# Patient Record
Sex: FEMALE | Race: WHITE | NOT HISPANIC OR LATINO | Employment: OTHER | URBAN - METROPOLITAN AREA
[De-identification: names, ages, dates, MRNs, and addresses within clinical notes are randomized per-mention and may not be internally consistent; named-entity substitution may affect disease eponyms.]

---

## 2022-08-01 ENCOUNTER — OFFICE VISIT (OUTPATIENT)
Dept: INTERVENTIONAL RADIOLOGY/VASCULAR | Facility: CLINIC | Age: 72
End: 2022-08-01
Payer: MEDICARE

## 2022-08-01 VITALS
WEIGHT: 192.44 LBS | HEIGHT: 65 IN | HEART RATE: 72 BPM | BODY MASS INDEX: 32.06 KG/M2 | SYSTOLIC BLOOD PRESSURE: 153 MMHG | DIASTOLIC BLOOD PRESSURE: 85 MMHG

## 2022-08-01 DIAGNOSIS — I67.1 UNRUPTURED CEREBRAL ANEURYSM: Primary | ICD-10-CM

## 2022-08-01 PROCEDURE — 99204 OFFICE O/P NEW MOD 45 MIN: CPT | Mod: S$PBB,,, | Performed by: RADIOLOGY

## 2022-08-01 PROCEDURE — 99999 PR PBB SHADOW E&M-EST. PATIENT-LVL II: ICD-10-PCS | Mod: PBBFAC,,, | Performed by: RADIOLOGY

## 2022-08-01 PROCEDURE — 99999 PR PBB SHADOW E&M-EST. PATIENT-LVL II: CPT | Mod: PBBFAC,,, | Performed by: RADIOLOGY

## 2022-08-01 PROCEDURE — 99212 OFFICE O/P EST SF 10 MIN: CPT | Mod: PBBFAC | Performed by: RADIOLOGY

## 2022-08-01 PROCEDURE — 99204 PR OFFICE/OUTPT VISIT, NEW, LEVL IV, 45-59 MIN: ICD-10-PCS | Mod: S$PBB,,, | Performed by: RADIOLOGY

## 2022-08-01 NOTE — PROGRESS NOTES
Neurointerventional Clinic Note      Reason for Visit: Brain aneurysm    SUBJECTIVE:     Chief Complaint: Consult for brain aneurysm.     History of Present Illness: Faviola Coughlin is a 72 y.o. female with HTN  and hx of prior stroke who presents for consultation of her small right ICA aneurysm incidentally discovered during a workup for abdominal pain.  She was noted to have left sided weakness, so an MRI/MRA   Was obtained showing remote right frontal infarct, old right insular hemorrhage, and a 4-5mm right supraclinoid ICA aneurysm.  She also takes Eliquis for a hypercoagulability condition.  She notes that her left foot is dragging more.  She also had a hx of Bell's Palsy with mild residual left facial droop.     She had outside surgical consultation who recommended gall bladder surgery, but this was postponed to assess the patient's brain aneurysm.      Review of Systems:  Review of Systems   Constitutional: Positive for activity change, appetite change and fatigue.   HENT: Negative.    Eyes: Negative.    Respiratory: Negative.    Cardiovascular: Negative.    Gastrointestinal: Positive for abdominal pain and nausea.   Endocrine: Negative.    Musculoskeletal: Negative.    Skin: Negative.    Neurological: Positive for facial asymmetry and weakness.   Psychiatric/Behavioral: Negative.       OBJECTIVE:     Vital Signs Range:  There were no vitals taken for this visit.    Physical Exam:  Physical Exam  Constitutional:       Appearance: Normal appearance.   HENT:      Mouth/Throat:      Mouth: Mucous membranes are moist.   Eyes:      Extraocular Movements: Extraocular movements intact.      Pupils: Pupils are equal, round, and reactive to light.   Cardiovascular:      Rate and Rhythm: Normal rate and regular rhythm.      Pulses: Normal pulses.   Musculoskeletal:      Cervical back: No rigidity or tenderness.      Right lower leg: Edema present.      Left lower leg: Edema present.      Comments: Bilateral pre-tibial  edema   Neurological:      Mental Status: She is alert and oriented to person, place, and time.      Comments: Left leg slightly weaker than right. Mild left eyelid drooping and slight left facial droop   Psychiatric:         Mood and Affect: Mood normal.         Behavior: Behavior normal.         Thought Content: Thought content normal.         Judgment: Judgment normal.         Laboratory:  No results found for this or any previous visit.    Diagnostic Results:  MRA 6/24/22       ASSESSMENT/PLAN:     1. Small right supraclinoid ICA aneurysm.    2. Remote right frontal white matter CVA.   3. Remote right insular cortex hemorrhage.    The right supraclinoid ICA aneurysm measures about 4-5 mm, and it appears to be amenable for endovascular treatment with coiling.  This is low risk for rupture, less than 1% per year.  Given her advanced age and comorbidity including prior strokes, I think the risk to benefit ratio favors observation.  I am happy to reconsider elective treatment if the patient is strongly in favor of this, but at this point she wishes to observe. She should maintain a normal blood pressure and avoid smoking.  I discussed warning signs for aneurysms such as worsening headaches.  I recommend periodic MRA to follow the aneurysm, every 1-2 years to ensure it remains stable in size.    I do not believe the presence of this small unruptured aneurysm causes her to be higher risk for anesthesia and gallbladder surgery.       Ash Hardy MD  Interventional Neuroradiology  [facility]

## 2023-05-23 ENCOUNTER — PATIENT MESSAGE (OUTPATIENT)
Dept: RESEARCH | Facility: HOSPITAL | Age: 73
End: 2023-05-23
Payer: MEDICARE

## 2023-08-28 ENCOUNTER — TELEPHONE (OUTPATIENT)
Dept: NEUROSURGERY | Facility: CLINIC | Age: 73
End: 2023-08-28
Payer: MEDICARE

## 2023-08-28 NOTE — TELEPHONE ENCOUNTER
Called [facility] in regards to obtaining disc of MRI/MRA from August. Was told they will need to call back.

## 2023-08-30 ENCOUNTER — TELEPHONE (OUTPATIENT)
Dept: NEUROSURGERY | Facility: CLINIC | Age: 73
End: 2023-08-30
Payer: MEDICARE

## 2023-08-30 NOTE — TELEPHONE ENCOUNTER
----- Message from Zonia Mendoza sent at 8/30/2023  3:07 PM CDT -----  Regarding: pt advice  Contact: Roberta hernandez/[facility] @[phone]  Caller stated she cannot read the order that was sent over on the pt behalf. Pls call to discuss.

## 2023-09-07 NOTE — TELEPHONE ENCOUNTER
Called [facility] and left another vm for Roberta. Left clinic number to return call. Looking for MRA.

## 2023-10-13 NOTE — TELEPHONE ENCOUNTER
Called [facility] again. Had to leave . Left message pt has appt on Monday and will need to cancel if can not get disc. Left clinic number to return call.

## 2023-10-16 ENCOUNTER — OFFICE VISIT (OUTPATIENT)
Dept: NEUROSURGERY | Facility: CLINIC | Age: 73
End: 2023-10-16
Payer: MEDICARE

## 2023-10-16 VITALS
SYSTOLIC BLOOD PRESSURE: 124 MMHG | HEIGHT: 65 IN | BODY MASS INDEX: 30.16 KG/M2 | DIASTOLIC BLOOD PRESSURE: 78 MMHG | HEART RATE: 70 BPM | WEIGHT: 181 LBS

## 2023-10-16 DIAGNOSIS — I67.1 RIGHT INTERNAL CAROTID ARTERY ANEURYSM: Primary | ICD-10-CM

## 2023-10-16 PROCEDURE — 99204 OFFICE O/P NEW MOD 45 MIN: CPT | Mod: S$PBB,,, | Performed by: PHYSICIAN ASSISTANT

## 2023-10-16 PROCEDURE — 99999 PR PBB SHADOW E&M-EST. PATIENT-LVL III: CPT | Mod: PBBFAC,,, | Performed by: PHYSICIAN ASSISTANT

## 2023-10-16 PROCEDURE — 99999 PR PBB SHADOW E&M-EST. PATIENT-LVL III: ICD-10-PCS | Mod: PBBFAC,,, | Performed by: PHYSICIAN ASSISTANT

## 2023-10-16 PROCEDURE — 99213 OFFICE O/P EST LOW 20 MIN: CPT | Mod: PBBFAC | Performed by: PHYSICIAN ASSISTANT

## 2023-10-16 PROCEDURE — 99204 PR OFFICE/OUTPT VISIT, NEW, LEVL IV, 45-59 MIN: ICD-10-PCS | Mod: S$PBB,,, | Performed by: PHYSICIAN ASSISTANT

## 2023-10-16 RX ORDER — ACETAMINOPHEN 500 MG
3 TABLET ORAL NIGHTLY PRN
COMMUNITY

## 2023-10-16 RX ORDER — BACLOFEN 10 MG/1
10 TABLET ORAL NIGHTLY PRN
COMMUNITY
Start: 2023-09-07

## 2023-10-16 RX ORDER — LOSARTAN POTASSIUM 50 MG/1
1 TABLET ORAL DAILY
COMMUNITY
Start: 2023-04-07

## 2023-10-16 RX ORDER — METOPROLOL SUCCINATE 25 MG/1
1 TABLET, EXTENDED RELEASE ORAL DAILY
COMMUNITY
Start: 2023-08-07

## 2023-10-16 RX ORDER — IPRATROPIUM BROMIDE 42 UG/1
SPRAY, METERED NASAL
COMMUNITY
Start: 2023-08-31

## 2023-10-16 RX ORDER — BENAZEPRIL HYDROCHLORIDE 20 MG/1
1 TABLET ORAL DAILY
COMMUNITY

## 2023-10-16 RX ORDER — ONDANSETRON 4 MG/1
4 TABLET, FILM COATED ORAL 4 TIMES DAILY PRN
COMMUNITY
Start: 2023-04-24

## 2023-10-16 RX ORDER — PANTOPRAZOLE SODIUM 40 MG/1
40 TABLET, DELAYED RELEASE ORAL DAILY PRN
COMMUNITY
Start: 2023-06-21

## 2023-10-16 RX ORDER — HYDROCHLOROTHIAZIDE 12.5 MG/1
1 CAPSULE ORAL DAILY
COMMUNITY
Start: 2023-08-21

## 2023-10-16 RX ORDER — CHOLECALCIFEROL (VITAMIN D3) 25 MCG
1 TABLET ORAL EVERY MORNING
COMMUNITY

## 2023-10-16 RX ORDER — HYDRALAZINE HYDROCHLORIDE 25 MG/1
25 TABLET, FILM COATED ORAL EVERY 8 HOURS PRN
COMMUNITY
Start: 2023-09-20 | End: 2024-09-19

## 2023-10-16 RX ORDER — AMLODIPINE BESYLATE 10 MG/1
1 TABLET ORAL EVERY MORNING
COMMUNITY
Start: 2023-09-20 | End: 2024-09-19

## 2023-10-16 NOTE — PROGRESS NOTES
Neurosurgery  History & Physical    SUBJECTIVE:     Chief Complaint: cerebral aneurysm    History of Present Illness:  Faviola Coughlin is a 73 y.o. female who presents as a referral from Viji Peña NP for evaluation of incidentally found aneurysm. Pt was noted to have left sided weakness in early 2022 prior to cholecystectomy, concerning for stroke, MRI/MRA of the brain was done, which showed remote R frontal infarct, old R insular hemorrhage, as well as incidentally found 4-5 mm R supraclinoid ICA aneurysm without rupture. She was seen by IR, Dr. Hardy, in August 2022, who recommended observation rather than treatment due to her elevated stroke risk and low risk of rupture, with MRA every 1-2 years. Pt recently had MRA done by her PCP, which was concerning for small amount of aneurysmal growth compared to prior scan, so was referred here for further management. Of note, pt has a hx of Factor V Leiden for which she is chronically on Eliquis. She also has hx of polymyalgia rheumatica, follows with Rheumatology. She has mild residual LSW, as well as residual L facial weakness, was previously diagnosed with Bell's Palsy.     Pt is a non-smoker.  No known family hx of aneurysms.    Review of patient's allergies indicates:  No Known Allergies    Current Outpatient Medications   Medication Sig Dispense Refill    amLODIPine (NORVASC) 10 MG tablet Take 1 tablet by mouth every morning.      apixaban (ELIQUIS) 2.5 mg Tab Take by mouth 2 (two) times daily.      ascorbic acid, vitamin C, (VITAMIN C) 100 MG tablet Take 1 tablet by mouth every morning.      baclofen (LIORESAL) 10 MG tablet Take 10 mg by mouth nightly as needed.      benazepriL (LOTENSIN) 20 MG tablet Take 1 tablet by mouth once daily.      hydrALAZINE (APRESOLINE) 25 MG tablet Take 25 mg by mouth every 8 (eight) hours as needed.      hydroCHLOROthiazide (MICROZIDE) 12.5 mg capsule Take 1 capsule by mouth once daily.      ipratropium (ATROVENT) 42 mcg  "(0.06 %) nasal spray USE 2 SPRAYS IN EACH NOSTRIL THREE TIMES DAILY FOR RUNNY NOSE      losartan (COZAAR) 50 MG tablet Take 1 tablet by mouth once daily.      melatonin (MELATIN) 5 mg Take 3 mg by mouth nightly as needed.      metoprolol succinate (TOPROL-XL) 25 MG 24 hr tablet Take 1 tablet by mouth once daily.      ondansetron (ZOFRAN) 4 MG tablet Take 4 mg by mouth 4 (four) times daily as needed.      pantoprazole (PROTONIX) 40 MG tablet Take 40 mg by mouth daily as needed.      vitamin D (VITAMIN D3) 1000 units Tab Take 1 tablet by mouth every morning.       No current facility-administered medications for this visit.       History reviewed. No pertinent past medical history.  History reviewed. No pertinent surgical history.  Family History    None       Social History     Socioeconomic History    Marital status:        Review of Systems  Positive per HPI, otherwise a pertinent ROS was performed and was negative.        OBJECTIVE:     Vital Signs  Pulse: 70  BP: 124/78  Pain Score:   4  Height: 5' 5" (165.1 cm)  Weight: 82.1 kg (181 lb)  Body mass index is 30.12 kg/m².      Neurosurgery Physical Exam  General: well developed, well nourished, no distress.   Head: normocephalic, atraumatic  Neck: No tracheal deviation. No palpable masses. Full ROM.   Neurologic: Alert and oriented. Thought content appropriate.  GCS: E4 V5 M6; Total: 15  Mental Status: Awake, Alert, Oriented x 4  Language: No aphasia  Speech: No dysarthria  Cranial nerves:   CN II: Pupils are equal, round and reactive to light and accommodation   CN III, IV & VI: Extraocular movements full and intact   CN V: Facial sensation intact bilaterally. Mastication muscles WNL  CN VII: Mild L-sided facial weakness.   CN VIII: Hearing intact to finger rub bilaterally.  CN IX & X: Tongue/uvula midline.   CN XI: Shoulder shrug/trapezius strength WNL.   CN XII: Tongue protrusion WNL.    Pulmonary: normal respirations, no signs of respiratory " distress  Abdomen: soft, non-distended, not tender to palpation  Skin: Skin is warm, dry and intact.    Sensory: intact to light touch throughout  Motor Strength: Moves all extremities spontaneously with good tone.  No abnormal movements seen. Mild L olive-paresis, 4+/5.    Finger-to-nose: intact bilaterally   Pronator drift: absent bilaterally        Diagnostic Results:  Imaging was independently reviewed by myself.    MRA brain 8/23/23:  - R supraclinoid ICA aneurysm, measuring approximately 5 mm, possible slight increase in size when compared to prior MRA from 6/24/22.         ASSESSMENT/PLAN:     1. Right internal carotid artery aneurysm        Faviola Coughlin is a 73 y.o. female with R supraclinoid ICA aneurysm, non-ruptured. Recent MRA concerning for small interval increase in size compared to prior scan from June 2022.     - Recommend MRA with vessel wall imaging to assess for any abnormal wall enhancement  - Recommend diagnostic angiogram for further characterization of aneurysm and to determine possible treatment plan   - We discussed the procedure in detail including risks/benefits, indications, and alternatives. All of the patient's/family's questions have been answered and patient wishes to proceed with procedure at this time.   - Follow up in clinic with Dr. Godoy after the above completed to discuss results and next steps      Diagnoses addressed and orders placed this encounter:      Right internal carotid artery aneurysm  -     MRA Brain with and without contrast; Future; Expected date: 10/25/2023  -     IR Angiogram Carotid Cerebral Bilateral inc Arch; Future; Expected date: 10/25/2023          Raeann Garcia PA-C  Department of Neurosurgery  [facility]

## 2023-10-25 ENCOUNTER — TELEPHONE (OUTPATIENT)
Dept: NEUROSURGERY | Facility: CLINIC | Age: 73
End: 2023-10-25
Payer: MEDICARE

## 2023-10-25 DIAGNOSIS — I67.1 RIGHT INTERNAL CAROTID ARTERY ANEURYSM: Primary | ICD-10-CM

## 2023-10-25 NOTE — TELEPHONE ENCOUNTER
Called and spoke with pt. Instructed MRA scheduled for 11/30/23 at 630am. Need to report to imaging center for 6am. Then will need to come to hospital after for angiogram. Pt plans to come in to town the night before. Will have labs done at [facility] on 11/29/23. Instructed once time gets closer will call and review angio instructions. Pt stated she does have the written instructions.

## 2023-11-09 ENCOUNTER — TELEPHONE (OUTPATIENT)
Dept: NEUROSURGERY | Facility: CLINIC | Age: 73
End: 2023-11-09
Payer: MEDICARE

## 2023-11-09 ENCOUNTER — PATIENT MESSAGE (OUTPATIENT)
Dept: NEUROSURGERY | Facility: CLINIC | Age: 73
End: 2023-11-09
Payer: MEDICARE

## 2023-11-09 NOTE — TELEPHONE ENCOUNTER
----- Message from Shanna Mayer MA sent at 11/8/2023 11:06 AM CST -----  Contact: [phone]  Tony Urbina, can you please advise this pt? Thank you!  ----- Message -----  From: Shivani Abraham  Sent: 11/8/2023  10:57 AM CST  To: Walt MARLOW Staff    PATIENTCALL     Pt is calling to speak with someone in provider office regarding she states that she is having a procedure done on 11/30 an she has gotten a call to let her know what medications she needs to stop she is asking for a return call please call pt at [phone]

## 2023-11-09 NOTE — TELEPHONE ENCOUNTER
Called and spoke with pt and dtr. Instructed on angiogram procedure.   MRA at 630 at imaging center across the street. Report to 3 rd floor SSCU after MRA.   Nothing to eat or drink after 12 midnight   Do not take any medications for sleep or anxiety night before procedure.  Do not take any diabetic oral medications the night before or morning of procedure  Take blood pressure medication with a small sip of water morning of procedure  Labs to be done prior to procedure  Remove any nail polish or gel nail from one finger of each hand  Morning of procedure shower with antibacterial soap (dial, dove). Do not use hair spray, hair pins/clips or other hair products.Do not use perfume, powder, deodorant, after shave, makeup, mascara or false eyelashes or lotions after shower.  May wear glasses, contact lens, dentures or hearing aids but bring case to put them in when procedure started  Do not lift anything heavier than 10 pounds  Avoid strenuous activity for 2 weeks  Will need someone to drive home after procedure and for about 3 days after procedure  Need to be monitor for about 8 hours after home. Observe for shortness of breath, numbness/tingling in any extremity,difficulty speaking (report to nearest emergency room)  Keep incision clian and dry for 24 hrs. Remove bandage after 24 hrs and shower. Do not bathe or submerge in water until site is completely healed. Do not allow force of water to hit site until healed.

## 2023-11-29 ENCOUNTER — TELEPHONE (OUTPATIENT)
Dept: INTERVENTIONAL RADIOLOGY/VASCULAR | Facility: HOSPITAL | Age: 73
End: 2023-11-29
Payer: MEDICARE

## 2023-11-29 ENCOUNTER — LAB VISIT (OUTPATIENT)
Dept: LAB | Facility: HOSPITAL | Age: 73
End: 2023-11-29
Attending: PHYSICIAN ASSISTANT
Payer: MEDICARE

## 2023-11-29 DIAGNOSIS — I67.1 RIGHT INTERNAL CAROTID ARTERY ANEURYSM: ICD-10-CM

## 2023-11-29 LAB
ANION GAP SERPL CALC-SCNC: 10 MMOL/L (ref 8–16)
BUN SERPL-MCNC: 15 MG/DL (ref 8–23)
CALCIUM SERPL-MCNC: 9.5 MG/DL (ref 8.7–10.5)
CHLORIDE SERPL-SCNC: 103 MMOL/L (ref 95–110)
CO2 SERPL-SCNC: 29 MMOL/L (ref 23–29)
CREAT SERPL-MCNC: 1.1 MG/DL (ref 0.5–1.4)
EST. GFR  (NO RACE VARIABLE): 52.4 ML/MIN/1.73 M^2
GLUCOSE SERPL-MCNC: 112 MG/DL (ref 70–110)
POTASSIUM SERPL-SCNC: 3.5 MMOL/L (ref 3.5–5.1)
SODIUM SERPL-SCNC: 142 MMOL/L (ref 136–145)

## 2023-11-29 PROCEDURE — 80048 BASIC METABOLIC PNL TOTAL CA: CPT | Performed by: PHYSICIAN ASSISTANT

## 2023-11-29 PROCEDURE — 36415 COLL VENOUS BLD VENIPUNCTURE: CPT | Performed by: PHYSICIAN ASSISTANT

## 2023-11-30 ENCOUNTER — HOSPITAL ENCOUNTER (OUTPATIENT)
Dept: RADIOLOGY | Facility: HOSPITAL | Age: 73
Discharge: HOME | End: 2023-11-30
Attending: PHYSICIAN ASSISTANT
Payer: MEDICARE

## 2023-11-30 ENCOUNTER — HOSPITAL ENCOUNTER (OUTPATIENT)
Dept: INTERVENTIONAL RADIOLOGY/VASCULAR | Facility: HOSPITAL | Age: 73
Discharge: HOME | End: 2023-11-30
Attending: PHYSICIAN ASSISTANT | Admitting: NEUROLOGICAL SURGERY
Payer: MEDICARE

## 2023-11-30 VITALS
OXYGEN SATURATION: 97 % | SYSTOLIC BLOOD PRESSURE: 159 MMHG | RESPIRATION RATE: 16 BRPM | HEART RATE: 66 BPM | DIASTOLIC BLOOD PRESSURE: 86 MMHG

## 2023-11-30 VITALS
HEART RATE: 69 BPM | DIASTOLIC BLOOD PRESSURE: 72 MMHG | RESPIRATION RATE: 20 BRPM | HEIGHT: 65 IN | WEIGHT: 183 LBS | TEMPERATURE: 99 F | SYSTOLIC BLOOD PRESSURE: 135 MMHG | BODY MASS INDEX: 30.49 KG/M2 | OXYGEN SATURATION: 95 %

## 2023-11-30 DIAGNOSIS — I67.1 RIGHT INTERNAL CAROTID ARTERY ANEURYSM: ICD-10-CM

## 2023-11-30 PROCEDURE — 36224 PLACE CATH CAROTD ART: CPT | Mod: 50,,, | Performed by: NEUROLOGICAL SURGERY

## 2023-11-30 PROCEDURE — C1769 GUIDE WIRE: HCPCS

## 2023-11-30 PROCEDURE — 76377 3D RENDER W/INTRP POSTPROCES: CPT | Mod: 59,TC | Performed by: NEUROLOGICAL SURGERY

## 2023-11-30 PROCEDURE — 36226 PLACE CATH VERTEBRAL ART: CPT | Mod: 51,RT,, | Performed by: NEUROLOGICAL SURGERY

## 2023-11-30 PROCEDURE — 25500020 PHARM REV CODE 255: Performed by: PHYSICIAN ASSISTANT

## 2023-11-30 PROCEDURE — 63600175 PHARM REV CODE 636 W HCPCS: Performed by: STUDENT IN AN ORGANIZED HEALTH CARE EDUCATION/TRAINING PROGRAM

## 2023-11-30 PROCEDURE — 76377 PR  3D RENDERING W/ IMAGE POSTPROCESS: ICD-10-PCS | Mod: 26,,, | Performed by: NEUROLOGICAL SURGERY

## 2023-11-30 PROCEDURE — 70546 MRA BRAIN WITH AND WITHOUT: ICD-10-PCS | Mod: 26,,, | Performed by: STUDENT IN AN ORGANIZED HEALTH CARE EDUCATION/TRAINING PROGRAM

## 2023-11-30 PROCEDURE — A9585 GADOBUTROL INJECTION: HCPCS | Performed by: PHYSICIAN ASSISTANT

## 2023-11-30 PROCEDURE — 25000003 PHARM REV CODE 250: Performed by: STUDENT IN AN ORGANIZED HEALTH CARE EDUCATION/TRAINING PROGRAM

## 2023-11-30 PROCEDURE — 36224 IR ANGIOGRAM CAROTID INTERNAL INC ARCH AND CEREBRAL BILAT: ICD-10-PCS | Mod: 50,,, | Performed by: NEUROLOGICAL SURGERY

## 2023-11-30 PROCEDURE — 63600175 PHARM REV CODE 636 W HCPCS: Performed by: NEUROLOGICAL SURGERY

## 2023-11-30 PROCEDURE — 70546 MR ANGIOGRAPH HEAD W/O&W/DYE: CPT | Mod: TC

## 2023-11-30 PROCEDURE — 76377 3D RENDER W/INTRP POSTPROCES: CPT | Mod: 26,,, | Performed by: NEUROLOGICAL SURGERY

## 2023-11-30 PROCEDURE — 36224 PLACE CATH CAROTD ART: CPT | Mod: 50 | Performed by: NEUROLOGICAL SURGERY

## 2023-11-30 PROCEDURE — 25500020 PHARM REV CODE 255: Performed by: NEUROLOGICAL SURGERY

## 2023-11-30 PROCEDURE — 36226 PR ANGIO VERTEBRAL ARTERY +/- CERVIOCEREBRAL ARCH, VERTEBRAL ART, SELECTV CATH,S&I: ICD-10-PCS | Mod: 51,RT,, | Performed by: NEUROLOGICAL SURGERY

## 2023-11-30 PROCEDURE — 36226 PLACE CATH VERTEBRAL ART: CPT | Mod: RT | Performed by: NEUROLOGICAL SURGERY

## 2023-11-30 PROCEDURE — 70546 MR ANGIOGRAPH HEAD W/O&W/DYE: CPT | Mod: 26,,, | Performed by: STUDENT IN AN ORGANIZED HEALTH CARE EDUCATION/TRAINING PROGRAM

## 2023-11-30 RX ORDER — SODIUM CHLORIDE 9 MG/ML
INJECTION, SOLUTION INTRAVENOUS CONTINUOUS
Status: DISCONTINUED | OUTPATIENT
Start: 2023-11-30 | End: 2023-12-01 | Stop reason: HOSPADM

## 2023-11-30 RX ORDER — SODIUM CHLORIDE 0.9 % (FLUSH) 0.9 %
10 SYRINGE (ML) INJECTION
Status: DISCONTINUED | OUTPATIENT
Start: 2023-11-30 | End: 2023-12-01 | Stop reason: HOSPADM

## 2023-11-30 RX ORDER — MIDAZOLAM HYDROCHLORIDE 1 MG/ML
INJECTION INTRAMUSCULAR; INTRAVENOUS
Status: COMPLETED | OUTPATIENT
Start: 2023-11-30 | End: 2023-11-30

## 2023-11-30 RX ORDER — ONDANSETRON 2 MG/ML
4 INJECTION INTRAMUSCULAR; INTRAVENOUS EVERY 12 HOURS PRN
Status: DISCONTINUED | OUTPATIENT
Start: 2023-11-30 | End: 2023-12-01 | Stop reason: HOSPADM

## 2023-11-30 RX ORDER — HEPARIN SODIUM 1000 [USP'U]/ML
INJECTION, SOLUTION INTRAVENOUS; SUBCUTANEOUS
Status: COMPLETED | OUTPATIENT
Start: 2023-11-30 | End: 2023-11-30

## 2023-11-30 RX ORDER — FENTANYL CITRATE 50 UG/ML
INJECTION, SOLUTION INTRAMUSCULAR; INTRAVENOUS
Status: COMPLETED | OUTPATIENT
Start: 2023-11-30 | End: 2023-11-30

## 2023-11-30 RX ORDER — OXYCODONE AND ACETAMINOPHEN 5; 325 MG/1; MG/1
TABLET ORAL
Status: DISCONTINUED
Start: 2023-11-30 | End: 2023-12-01 | Stop reason: HOSPADM

## 2023-11-30 RX ORDER — IODIXANOL 320 MG/ML
100 INJECTION, SOLUTION INTRAVASCULAR
Status: COMPLETED | OUTPATIENT
Start: 2023-11-30 | End: 2023-11-30

## 2023-11-30 RX ORDER — NAPROXEN SODIUM 220 MG/1
81 TABLET, FILM COATED ORAL DAILY
COMMUNITY
End: 2024-01-11

## 2023-11-30 RX ORDER — GADOBUTROL 604.72 MG/ML
9 INJECTION INTRAVENOUS
Status: COMPLETED | OUTPATIENT
Start: 2023-11-30 | End: 2023-11-30

## 2023-11-30 RX ORDER — HYDROCODONE BITARTRATE AND ACETAMINOPHEN 5; 325 MG/1; MG/1
1 TABLET ORAL EVERY 6 HOURS PRN
Status: DISCONTINUED | OUTPATIENT
Start: 2023-11-30 | End: 2023-12-01 | Stop reason: HOSPADM

## 2023-11-30 RX ADMIN — FENTANYL CITRATE 50 MCG: 50 INJECTION, SOLUTION INTRAMUSCULAR; INTRAVENOUS at 01:11

## 2023-11-30 RX ADMIN — MIDAZOLAM HYDROCHLORIDE 1 MG: 2 INJECTION, SOLUTION INTRAMUSCULAR; INTRAVENOUS at 01:11

## 2023-11-30 RX ADMIN — HEPARIN SODIUM 1500 UNITS: 1000 INJECTION, SOLUTION INTRAVENOUS; SUBCUTANEOUS at 02:11

## 2023-11-30 RX ADMIN — FENTANYL CITRATE 25 MCG: 50 INJECTION, SOLUTION INTRAMUSCULAR; INTRAVENOUS at 02:11

## 2023-11-30 RX ADMIN — IODIXANOL 80 ML: 320 INJECTION, SOLUTION INTRAVASCULAR at 03:11

## 2023-11-30 RX ADMIN — FENTANYL CITRATE 25 MCG: 50 INJECTION, SOLUTION INTRAMUSCULAR; INTRAVENOUS at 01:11

## 2023-11-30 RX ADMIN — MIDAZOLAM HYDROCHLORIDE 0.5 MG: 2 INJECTION, SOLUTION INTRAMUSCULAR; INTRAVENOUS at 01:11

## 2023-11-30 RX ADMIN — HYDROCODONE BITARTRATE AND ACETAMINOPHEN 1 TABLET: 5; 325 TABLET ORAL at 03:11

## 2023-11-30 RX ADMIN — MIDAZOLAM HYDROCHLORIDE 0.5 MG: 2 INJECTION, SOLUTION INTRAMUSCULAR; INTRAVENOUS at 02:11

## 2023-11-30 RX ADMIN — GADOBUTROL 9 ML: 604.72 INJECTION INTRAVENOUS at 07:11

## 2023-11-30 NOTE — PLAN OF CARE
Pt arrived to IR rm 4 for diagnostic cerebral angiogram. Pt oriented to unit and staff, Pt safely transferred from stretcher to procedural table. Fall risk reviewed and comfort measures utilized with interventions. Safety strap applied, position pillows to minimize pressure points. Blankets applied. Pt prepped and draped utilizing standard sterile technique. Patient placed on continuous monitoring, as required by sedation policy. Timeouts implemented utilizing standard universal time-out per department and facility policy. CRNA to remain at bedside with continuous monitoring. Pt resting comfortably. Denies pain/discomfort. Will continue to monitor. See flow sheets for monitoring, medication administration, and updates. patient verbalizes understanding.

## 2023-11-30 NOTE — PLAN OF CARE
Procedure complete. Pt tolerated well. Recovery for 4 hr flat, manual pressure, hemostasis time 1455. Site CDI. Pt transferred to [place] [id number] and report to be given bedside.

## 2023-11-30 NOTE — H&P
HPI:  Faviola Coughlin is a 73 y.o. female who presents from outpatient clinic for angiogram to evaluate incidentally found R supraclinoid ICA aneurysm. Pt was noted to have left sided weakness in early 2022 prior to cholecystectomy, concerning for stroke, MRI/MRA of the brain was done, which showed remote R frontal infarct, old R insular hemorrhage, as well as incidentally found 4-5 mm R supraclinoid ICA aneurysm without rupture. She was seen by IR, Dr. Hardy, in August 2022, who recommended observation rather than treatment due to her elevated stroke risk and low risk of rupture, with MRA every 1-2 years. Pt recently had MRA done by her PCP, which was concerning for small amount of aneurysmal growth compared to prior scan, so was referred here for further management. Of note, pt has a hx of Factor V Leiden for which she is chronically on Eliquis. She also has hx of polymyalgia rheumatica, follows with Rheumatology. She has mild residual LSW, as well as residual L facial weakness, was previously diagnosed with Bell's Palsy.       ROS:  10-point ROS otherwise negative      Exam:  General: well developed, well nourished, no distress.   Head: normocephalic, atraumatic  Neck: No tracheal deviation. No palpable masses. Full ROM.   Neurologic: Alert and oriented. Thought content appropriate.  GCS: E4 V5 M6; Total: 15  Mental Status: Awake, Alert, Oriented x 4  Language: No aphasia  Speech: No dysarthria  Cranial nerves:   CN II: Pupils are equal, round and reactive to light and accommodation   CN III, IV & VI: Extraocular movements full and intact   CN V: Facial sensation intact bilaterally. Mastication muscles WNL  CN VII: Mild L-sided facial weakness.   CN VIII: Hearing intact to finger rub bilaterally.  CN IX & X: Tongue/uvula midline.   CN XI: Shoulder shrug/trapezius strength WNL.   CN XII: Tongue protrusion WNL.     Pulmonary: normal respirations, no signs of respiratory distress  Abdomen: soft, non-distended,  not tender to palpation  Skin: Skin is warm, dry and intact.     Sensory: intact to light touch throughout  Motor Strength: Moves all extremities spontaneously with good tone.  No abnormal movements seen. Mild L olive-paresis, 4+/5.     Finger-to-nose: intact bilaterally   Pronator drift: absent bilaterally      A&P:  73 y.o. female who presents from outpatient clinic for angiogram to evaluate incidentally found R supraclinoid ICA aneurysm:    --Patient evaluated prior to procedure          -ASA 2/Mallampati 2  --Plan for cerebral angiogram with possible intervention; moderate sedation  --All diagnostics and imaging reviewed  --Patient NPO since MN  --No anti-coag/plt medication in the last 72h  --Risks & benefits of procedure explained in detail; patient consented and all questions answered  --Further reccs to follow procedure    Dispo: Ongoing

## 2023-11-30 NOTE — DISCHARGE INSTRUCTIONS
Please call with any questions or concerns.      Monday thru Friday 8:00 am - 4:30 pm    Interventional Radiology   [phone]    After Hours    Ask for the Radiology Resident on call  [phone]

## 2023-11-30 NOTE — BRIEF OP NOTE
Procedural Date:  11/30/23    Attending:  Mary Jo Godoy MD    Fellow(s):  MD Umair Lozoya MD    Preoperative Diagnoses:   R supraclinoid ICA aneurysm     Postoperative Diagnosis:  Same; 5x6mm R supraclinoid ICA aneurysm with multiple excrescences, small paraclinoid outpouching remote to larger aneurysm      Procedure:   4-vessel cerebral angiogram without intervention     Written Informed Consent Obtained:   Yes; obtained from patient     Specimen Removed:   None     Estimated Blood Loss:  Minimal     Brief Procedure report:   A 5 Bulgarian sheath was placed into the right common femoral artery and a 5 Bulgarian Kyle diagnostic catheter was advanced into the aortic arch.  The R innominate, R vertebral, R common carotid, R internal carotid, L common carotid, L internal carotid & L vertebral arteries were subselected and angiography of the brain was performed. A 5x6mm R supraclinoid ICA aneurysm with multiple excrescences and a small paraclinoid outpouching remote to larger aneurysm was appreciated on multiple runs including 3D spin sequence. There is no other evidence of aneurysm, fistula, malformation, or significant stenoocclusive disease.  A right common femoral artery angiogram was performed, the sheath removed and hemostasis achieved via 5 Bulgarian Mynx closure device.  No hematoma was present at the time of hemostasis.   The patient tolerated the procedure well.     Preliminary Interpretation:   1.    5x6mm R supraclinoid ICA aneurysm with multiple excrescences, small paraclinoid outpouching remote to larger aneurysm   2.    Otherwise normal cerebral angiogram.     (Please see Imaging report for full details)    Disposition:  [facility]

## 2023-12-01 NOTE — NURSING
Pt discharged. D/c instructions reviewed with patient and family. All questions answered at this time. VSS. Site CDI. Pt transported via wheelchair to [facility] by family member.

## 2023-12-04 ENCOUNTER — TELEPHONE (OUTPATIENT)
Dept: NEUROSURGERY | Facility: CLINIC | Age: 73
End: 2023-12-04
Payer: MEDICARE

## 2023-12-05 ENCOUNTER — TELEPHONE (OUTPATIENT)
Dept: NEUROSURGERY | Facility: CLINIC | Age: 73
End: 2023-12-05
Payer: MEDICARE

## 2023-12-05 NOTE — TELEPHONE ENCOUNTER
----- Message from Rand Rod sent at 12/5/2023 10:25 AM CST -----  Regarding: Rash  Contact: Pt @ [phone]  Pt is calling to inform office that she broke out with a rash all over her body. Asking for a call back

## 2023-12-05 NOTE — TELEPHONE ENCOUNTER
Returned call to pt. Pt stated she broke out in rash. Explained if it is the dye from angio usually happens right after injections. Instructed to notify pcp and can take otc benadryl. Pt verbalized understanding.

## 2023-12-13 ENCOUNTER — CLINICAL SUPPORT (OUTPATIENT)
Dept: NEUROSURGERY | Facility: CLINIC | Age: 73
End: 2023-12-13
Payer: MEDICARE

## 2023-12-13 DIAGNOSIS — I67.1 RIGHT INTERNAL CAROTID ARTERY ANEURYSM: Primary | ICD-10-CM

## 2023-12-13 NOTE — PROGRESS NOTES
Faviola Gutierrez a 73 y.o. female here for 2 week post op wound check.    Surgery: Pt is POD 14 FROM angiogram  on 11/30/23 by Dr. Godoy.    DME: none    Brace in Use: none    SUBJECTIVE    New Symptoms: pt c/o developed severe rash over entire body a few days after the angiogram. Not sure what caused it. Placed iodine allergy on chart.        PAIN MANAGEMENT     0,       INCISION (S)    Location: rt groin    Incision Status: Clean, Dry, RAOUL. Edges well-approximated. No drainage or swelling noted.     PICTURE    Pt declined to have picture taken. Stated site is closed, no openings, no drainage, no redness or swelling.     INTERVENTION    Incision: closed edges approximated not draninage, redness or swelling as reported by pt.     Medication Refills Requested: None     EDUCATION    Reviewed Post Op instructions with patient.  Keep incision RAOUL, no lotions, creams or bandages.  Ok to shower without direct water pressure to incision. Pat dry after shower.  Do not submerge wound in bath tub or go swimming until released by surgeon.  No lifting > 10lbs.  Take over the counter stool softner/laxative for constipation.  Call your doctor or report to the Emergency Room for any signs of infection, including: increased redness, drainage, pain or fever (temperature greater than or equal to 101.5 for 24 hours). Call doctor or go to the Emergency Room if there are any localized neurological changes; problems with speech, vision, numbness, tingling, weakness, or severe headache; or for other concerns.      All questions answered. Pt encouraged to call clinic or send message through portal with any future concerns. Patient verbalized understanding.     FOLLOW UP    Future Appointments   Date Time Provider Department Center   12/13/2023 10:00 AM Dilma Bustos, RAN [facility]   1/2/2024 10:30 AM Raeann Garcia PA-C [facility]

## 2024-01-02 ENCOUNTER — TELEPHONE (OUTPATIENT)
Dept: NEUROSURGERY | Facility: CLINIC | Age: 74
End: 2024-01-02
Payer: MEDICARE

## 2024-01-11 DIAGNOSIS — I67.1 RIGHT INTERNAL CAROTID ARTERY ANEURYSM: Primary | ICD-10-CM

## 2024-01-11 RX ORDER — ASPIRIN 325 MG
325 TABLET, DELAYED RELEASE (ENTERIC COATED) ORAL DAILY
Qty: 30 TABLET | Refills: 11 | Status: SHIPPED | OUTPATIENT
Start: 2024-01-11 | End: 2025-01-10

## 2024-01-11 RX ORDER — CLOPIDOGREL BISULFATE 75 MG/1
75 TABLET ORAL DAILY
Qty: 30 TABLET | Refills: 11 | Status: SHIPPED | OUTPATIENT
Start: 2024-01-11 | End: 2025-01-10

## 2024-01-11 NOTE — PROGRESS NOTES
Spoke with pt via phone regarding results of recent imaging studies.     Diagnostic angiogram showed multi-lobulated R ICA supraclinoid aneurysm. Neck 2.67 mm x varied 6.52 mm x length 5.36 mm.   MRA with vessel wall imaging with mild enhancement along the aneurysm wall.  Recent MRA from 8/23/23 was suggestive of mild growth of aneurysm as compared to prior imaging from 2022.    - Given the above, would recommend treatment of aneurysm at this stage. Plan for endovascular embolization with combination of coiling and Flow Diversion. Discussed with patient and in agreement with proceeding.  - Will start on  mg QD and Plavix 75 mg QD at least 2 weeks prior to procedure  - ARU/PRU preop to ensure therapeutic levels    Plan was discussed with Dr. Walt Garcia, PA-C  Neurosurgery  [facility]

## 2024-01-22 ENCOUNTER — PATIENT MESSAGE (OUTPATIENT)
Dept: NEUROSURGERY | Facility: CLINIC | Age: 74
End: 2024-01-22
Payer: MEDICARE

## 2024-02-09 ENCOUNTER — PATIENT MESSAGE (OUTPATIENT)
Dept: NEUROSURGERY | Facility: CLINIC | Age: 74
End: 2024-02-09
Payer: MEDICARE

## 2024-02-28 ENCOUNTER — PATIENT MESSAGE (OUTPATIENT)
Dept: NEUROSURGERY | Facility: CLINIC | Age: 74
End: 2024-02-28
Payer: MEDICARE

## 2024-03-18 ENCOUNTER — PATIENT MESSAGE (OUTPATIENT)
Dept: NEUROSURGERY | Facility: CLINIC | Age: 74
End: 2024-03-18
Payer: MEDICARE

## 2024-03-21 ENCOUNTER — PATIENT MESSAGE (OUTPATIENT)
Dept: NEUROSURGERY | Facility: CLINIC | Age: 74
End: 2024-03-21
Payer: MEDICARE

## 2024-03-26 ENCOUNTER — TELEPHONE (OUTPATIENT)
Dept: NEUROSURGERY | Facility: CLINIC | Age: 74
End: 2024-03-26
Payer: MEDICARE

## 2024-03-26 DIAGNOSIS — I67.1 RIGHT INTERNAL CAROTID ARTERY ANEURYSM: Primary | ICD-10-CM

## 2024-03-26 RX ORDER — PREDNISONE 50 MG/1
TABLET ORAL
Qty: 3 TABLET | Refills: 0 | Status: ON HOLD | OUTPATIENT
Start: 2024-03-26 | End: 2024-04-12

## 2024-03-26 RX ORDER — DIPHENHYDRAMINE HCL 50 MG
CAPSULE ORAL
Qty: 1 CAPSULE | Refills: 0 | Status: ON HOLD
Start: 2024-03-26 | End: 2024-04-12 | Stop reason: HOSPADM

## 2024-04-02 ENCOUNTER — TELEPHONE (OUTPATIENT)
Dept: NEUROSURGERY | Facility: CLINIC | Age: 74
End: 2024-04-02
Payer: MEDICARE

## 2024-04-02 DIAGNOSIS — I67.1 RIGHT INTERNAL CAROTID ARTERY ANEURYSM: Primary | ICD-10-CM

## 2024-04-05 ENCOUNTER — LAB VISIT (OUTPATIENT)
Dept: LAB | Facility: HOSPITAL | Age: 74
End: 2024-04-05
Payer: MEDICARE

## 2024-04-05 ENCOUNTER — TELEPHONE (OUTPATIENT)
Dept: NEUROSURGERY | Facility: CLINIC | Age: 74
End: 2024-04-05
Payer: MEDICARE

## 2024-04-05 ENCOUNTER — PATIENT MESSAGE (OUTPATIENT)
Dept: NEUROSURGERY | Facility: CLINIC | Age: 74
End: 2024-04-05
Payer: MEDICARE

## 2024-04-05 DIAGNOSIS — I67.1 RIGHT INTERNAL CAROTID ARTERY ANEURYSM: ICD-10-CM

## 2024-04-05 DIAGNOSIS — I67.1 RIGHT INTERNAL CAROTID ARTERY ANEURYSM: Primary | ICD-10-CM

## 2024-04-05 LAB
PA AA PRP-ACNC: 383 ARU (ref 620–672)
PLATELET RESPONSE PLAVIX: 214 PRU (ref 194–418)

## 2024-04-05 PROCEDURE — 36415 COLL VENOUS BLD VENIPUNCTURE: CPT | Performed by: PHYSICIAN ASSISTANT

## 2024-04-05 PROCEDURE — 85576 BLOOD PLATELET AGGREGATION: CPT | Mod: 91 | Performed by: PHYSICIAN ASSISTANT

## 2024-04-05 PROCEDURE — 85576 BLOOD PLATELET AGGREGATION: CPT | Performed by: PHYSICIAN ASSISTANT

## 2024-04-05 RX ORDER — ASPIRIN 81 MG/1
81 TABLET ORAL DAILY
Qty: 30 TABLET | Refills: 0 | Status: SHIPPED | OUTPATIENT
Start: 2024-04-05 | End: 2025-04-05

## 2024-04-05 NOTE — TELEPHONE ENCOUNTER
Returned call to pt. Explained that the PA is in clinic so the prescription has not been called in yet. Informed that we already sent the PA a message. Stated that we don't know how long it will take and do not have control on it.  Pt v/u

## 2024-04-05 NOTE — TELEPHONE ENCOUNTER
Called and spoke with pt. Instructed labs are not therapeutic so have to stop plavix and begin Brilinta 90mg two times a day with ASA 81mg. Instructed to decrease asa to 81 mg. Repeat labs on Wednesday 04/10/24 per pt request. Pt rose.

## 2024-04-05 NOTE — TELEPHONE ENCOUNTER
----- Message from Lois Salvador sent at 4/5/2024  2:54 PM CDT -----  Contact: [phone]  LUPE GARSIA calling regarding Patient Advice (message) for want to know the name of medication that was call into [facility] pharmacy today.  She is waiting outside store for it because she live 20 miles from [facility].   Did you send a Rx to them for her.  Please advise    [id number] - [place] - [address]  [address] [place] [address]  Phone: [phone] Fax: [phone]

## 2024-04-10 ENCOUNTER — TELEPHONE (OUTPATIENT)
Dept: NEUROSURGERY | Facility: CLINIC | Age: 74
End: 2024-04-10
Payer: MEDICARE

## 2024-04-10 ENCOUNTER — DOCUMENTATION ONLY (OUTPATIENT)
Dept: PREADMISSION TESTING | Facility: HOSPITAL | Age: 74
End: 2024-04-10
Payer: MEDICARE

## 2024-04-10 NOTE — PRE-PROCEDURE INSTRUCTIONS
PRE-OP INSTRUCTIONS:  Instructed patient to have no food,milk or milk products after midnight   It is ok to take AM medications with a few sips of water   Medication instructions for pm prior to and am of surgery reviewed.  Instructed patient to avoid taking vitamins,supplements or ibuprofen the am of surgery.  Shower instructions provided    Patient denies any side effects or issues with anesthesia or sedation other than PONV

## 2024-04-10 NOTE — TELEPHONE ENCOUNTER
Called and spoke with pt. Instructed labs were good for procedure. Reminded about pre meds. Pt rose.

## 2024-04-11 ENCOUNTER — ANESTHESIA (OUTPATIENT)
Dept: INTERVENTIONAL RADIOLOGY/VASCULAR | Facility: HOSPITAL | Age: 74
DRG: 026 | End: 2024-04-11
Payer: MEDICARE

## 2024-04-11 ENCOUNTER — HOSPITAL ENCOUNTER (INPATIENT)
Dept: INTERVENTIONAL RADIOLOGY/VASCULAR | Facility: HOSPITAL | Age: 74
LOS: 2 days | Discharge: HOME HEALTH | DRG: 026 | End: 2024-04-13
Attending: NEUROLOGICAL SURGERY | Admitting: PSYCHIATRY & NEUROLOGY
Payer: MEDICARE

## 2024-04-11 DIAGNOSIS — I67.1 RIGHT INTERNAL CAROTID ARTERY ANEURYSM: Primary | ICD-10-CM

## 2024-04-11 PROBLEM — D68.51 FACTOR V LEIDEN: Status: ACTIVE | Noted: 2024-04-11

## 2024-04-11 PROBLEM — I69.30 HISTORY OF STROKE WITH CURRENT RESIDUAL EFFECTS: Status: ACTIVE | Noted: 2024-04-11

## 2024-04-11 PROBLEM — I10 ESSENTIAL HYPERTENSION: Status: ACTIVE | Noted: 2024-04-11

## 2024-04-11 PROBLEM — Z86.718 HISTORY OF DVT (DEEP VEIN THROMBOSIS): Status: ACTIVE | Noted: 2024-04-11

## 2024-04-11 LAB
ALBUMIN SERPL BCP-MCNC: 3.5 G/DL (ref 3.5–5.2)
ALP SERPL-CCNC: 79 U/L (ref 55–135)
ALT SERPL W/O P-5'-P-CCNC: 5 U/L (ref 10–44)
ANION GAP SERPL CALC-SCNC: 11 MMOL/L (ref 8–16)
AST SERPL-CCNC: 16 U/L (ref 10–40)
BASOPHILS # BLD AUTO: 0.01 K/UL (ref 0–0.2)
BASOPHILS NFR BLD: 0.1 % (ref 0–1.9)
BILIRUB SERPL-MCNC: 1 MG/DL (ref 0.1–1)
BUN SERPL-MCNC: 14 MG/DL (ref 8–23)
CALCIUM SERPL-MCNC: 9.3 MG/DL (ref 8.7–10.5)
CHLORIDE SERPL-SCNC: 106 MMOL/L (ref 95–110)
CO2 SERPL-SCNC: 24 MMOL/L (ref 23–29)
CREAT SERPL-MCNC: 0.9 MG/DL (ref 0.5–1.4)
DIFFERENTIAL METHOD BLD: ABNORMAL
EOSINOPHIL # BLD AUTO: 0 K/UL (ref 0–0.5)
EOSINOPHIL NFR BLD: 0 % (ref 0–8)
ERYTHROCYTE [DISTWIDTH] IN BLOOD BY AUTOMATED COUNT: 13 % (ref 11.5–14.5)
EST. GFR  (NO RACE VARIABLE): >60 ML/MIN/1.73 M^2
GLUCOSE SERPL-MCNC: 150 MG/DL (ref 70–110)
HCT VFR BLD AUTO: 34.7 % (ref 37–48.5)
HGB BLD-MCNC: 12 G/DL (ref 12–16)
IMM GRANULOCYTES # BLD AUTO: 0.06 K/UL (ref 0–0.04)
IMM GRANULOCYTES NFR BLD AUTO: 0.6 % (ref 0–0.5)
LYMPHOCYTES # BLD AUTO: 1.6 K/UL (ref 1–4.8)
LYMPHOCYTES NFR BLD: 16.3 % (ref 18–48)
MAGNESIUM SERPL-MCNC: 1.9 MG/DL (ref 1.6–2.6)
MCH RBC QN AUTO: 33 PG (ref 27–31)
MCHC RBC AUTO-ENTMCNC: 34.6 G/DL (ref 32–36)
MCV RBC AUTO: 95 FL (ref 82–98)
MONOCYTES # BLD AUTO: 0.4 K/UL (ref 0.3–1)
MONOCYTES NFR BLD: 4.1 % (ref 4–15)
NEUTROPHILS # BLD AUTO: 8 K/UL (ref 1.8–7.7)
NEUTROPHILS NFR BLD: 78.9 % (ref 38–73)
NRBC BLD-RTO: 0 /100 WBC
PHOSPHATE SERPL-MCNC: 3.6 MG/DL (ref 2.7–4.5)
PLATELET # BLD AUTO: 282 K/UL (ref 150–450)
PMV BLD AUTO: 11.3 FL (ref 9.2–12.9)
POTASSIUM SERPL-SCNC: 3.2 MMOL/L (ref 3.5–5.1)
PROT SERPL-MCNC: 6.8 G/DL (ref 6–8.4)
RBC # BLD AUTO: 3.64 M/UL (ref 4–5.4)
SODIUM SERPL-SCNC: 141 MMOL/L (ref 136–145)
TROPONIN I SERPL DL<=0.01 NG/ML-MCNC: <0.006 NG/ML (ref 0–0.03)
WBC # BLD AUTO: 10.08 K/UL (ref 3.9–12.7)

## 2024-04-11 PROCEDURE — C2628 CATHETER, OCCLUSION: HCPCS

## 2024-04-11 PROCEDURE — 25000003 PHARM REV CODE 250: Performed by: NURSE ANESTHETIST, CERTIFIED REGISTERED

## 2024-04-11 PROCEDURE — D9220A PRA ANESTHESIA: Mod: ANES,,, | Performed by: ANESTHESIOLOGY

## 2024-04-11 PROCEDURE — 75894 X-RAYS TRANSCATH THERAPY: CPT | Mod: TC | Performed by: NEUROLOGICAL SURGERY

## 2024-04-11 PROCEDURE — 03VG3HZ RESTRICTION OF INTRACRANIAL ARTERY WITH INTRALUMINAL DEVICE, FLOW DIVERTER, PERCUTANEOUS APPROACH: ICD-10-PCS | Performed by: NEUROLOGICAL SURGERY

## 2024-04-11 PROCEDURE — 84484 ASSAY OF TROPONIN QUANT: CPT

## 2024-04-11 PROCEDURE — 99291 CRITICAL CARE FIRST HOUR: CPT | Mod: ,,,

## 2024-04-11 PROCEDURE — 25500020 PHARM REV CODE 255: Performed by: NEUROLOGICAL SURGERY

## 2024-04-11 PROCEDURE — 61624 TCAT PERM OCCLS/EMBOLJ CNS: CPT | Performed by: NEUROLOGICAL SURGERY

## 2024-04-11 PROCEDURE — D9220A PRA ANESTHESIA: Mod: CRNA,,, | Performed by: NURSE ANESTHETIST, CERTIFIED REGISTERED

## 2024-04-11 PROCEDURE — C1887 CATHETER, GUIDING: HCPCS

## 2024-04-11 PROCEDURE — 37000009 HC ANESTHESIA EA ADD 15 MINS

## 2024-04-11 PROCEDURE — C1769 GUIDE WIRE: HCPCS

## 2024-04-11 PROCEDURE — C1894 INTRO/SHEATH, NON-LASER: HCPCS

## 2024-04-11 PROCEDURE — 25000003 PHARM REV CODE 250: Performed by: STUDENT IN AN ORGANIZED HEALTH CARE EDUCATION/TRAINING PROGRAM

## 2024-04-11 PROCEDURE — B316YZZ FLUOROSCOPY OF RIGHT INTERNAL CAROTID ARTERY USING OTHER CONTRAST: ICD-10-PCS | Performed by: NEUROLOGICAL SURGERY

## 2024-04-11 PROCEDURE — 27800903 OPTIME MED/SURG SUP & DEVICES OTHER IMPLANTS

## 2024-04-11 PROCEDURE — 80053 COMPREHEN METABOLIC PANEL: CPT

## 2024-04-11 PROCEDURE — 63600175 PHARM REV CODE 636 W HCPCS: Performed by: NURSE ANESTHETIST, CERTIFIED REGISTERED

## 2024-04-11 PROCEDURE — 27201423 OPTIME MED/SURG SUP & DEVICES STERILE SUPPLY

## 2024-04-11 PROCEDURE — 75898 FOLLOW-UP ANGIOGRAPHY: CPT | Mod: TC | Performed by: NEUROLOGICAL SURGERY

## 2024-04-11 PROCEDURE — 25000003 PHARM REV CODE 250

## 2024-04-11 PROCEDURE — 37000008 HC ANESTHESIA 1ST 15 MINUTES

## 2024-04-11 PROCEDURE — 20000000 HC ICU ROOM

## 2024-04-11 PROCEDURE — 61624 TCAT PERM OCCLS/EMBOLJ CNS: CPT | Mod: ,,, | Performed by: NEUROLOGICAL SURGERY

## 2024-04-11 PROCEDURE — 75894 X-RAYS TRANSCATH THERAPY: CPT | Mod: 26,,, | Performed by: NEUROLOGICAL SURGERY

## 2024-04-11 PROCEDURE — 36224 PLACE CATH CAROTD ART: CPT | Mod: RT | Performed by: NEUROLOGICAL SURGERY

## 2024-04-11 PROCEDURE — 85025 COMPLETE CBC W/AUTO DIFF WBC: CPT

## 2024-04-11 PROCEDURE — G0269 OCCLUSIVE DEVICE IN VEIN ART: HCPCS | Performed by: NEUROLOGICAL SURGERY

## 2024-04-11 PROCEDURE — 83735 ASSAY OF MAGNESIUM: CPT

## 2024-04-11 PROCEDURE — 75898 FOLLOW-UP ANGIOGRAPHY: CPT | Mod: 26,59,, | Performed by: NEUROLOGICAL SURGERY

## 2024-04-11 PROCEDURE — 63600175 PHARM REV CODE 636 W HCPCS

## 2024-04-11 PROCEDURE — 36224 PLACE CATH CAROTD ART: CPT | Mod: 59,RT,, | Performed by: NEUROLOGICAL SURGERY

## 2024-04-11 PROCEDURE — 61650 EVASC PRLNG ADMN RX AGNT 1ST: CPT | Mod: ,,, | Performed by: NEUROLOGICAL SURGERY

## 2024-04-11 PROCEDURE — 61650 EVASC PRLNG ADMN RX AGNT 1ST: CPT | Performed by: NEUROLOGICAL SURGERY

## 2024-04-11 PROCEDURE — C1884 EMBOLIZATION PROTECT SYST: HCPCS

## 2024-04-11 PROCEDURE — 84100 ASSAY OF PHOSPHORUS: CPT

## 2024-04-11 PROCEDURE — B313YZZ FLUOROSCOPY OF RIGHT COMMON CAROTID ARTERY USING OTHER CONTRAST: ICD-10-PCS | Performed by: NEUROLOGICAL SURGERY

## 2024-04-11 RX ORDER — PROPOFOL 10 MG/ML
VIAL (ML) INTRAVENOUS
Status: DISCONTINUED | OUTPATIENT
Start: 2024-04-11 | End: 2024-04-11

## 2024-04-11 RX ORDER — AMLODIPINE BESYLATE 10 MG/1
10 TABLET ORAL DAILY
Status: DISCONTINUED | OUTPATIENT
Start: 2024-04-11 | End: 2024-04-13 | Stop reason: HOSPADM

## 2024-04-11 RX ORDER — NICARDIPINE HYDROCHLORIDE 0.2 MG/ML
INJECTION INTRAVENOUS
Status: COMPLETED
Start: 2024-04-11 | End: 2024-04-11

## 2024-04-11 RX ORDER — ONDANSETRON HYDROCHLORIDE 2 MG/ML
4 INJECTION, SOLUTION INTRAVENOUS EVERY 6 HOURS PRN
Status: DISCONTINUED | OUTPATIENT
Start: 2024-04-11 | End: 2024-04-13 | Stop reason: HOSPADM

## 2024-04-11 RX ORDER — NAPROXEN SODIUM 220 MG/1
81 TABLET, FILM COATED ORAL DAILY
Status: DISCONTINUED | OUTPATIENT
Start: 2024-04-11 | End: 2024-04-13 | Stop reason: HOSPADM

## 2024-04-11 RX ORDER — CLOPIDOGREL BISULFATE 75 MG/1
75 TABLET ORAL DAILY
Status: DISCONTINUED | OUTPATIENT
Start: 2024-04-11 | End: 2024-04-11

## 2024-04-11 RX ORDER — HYDRALAZINE HYDROCHLORIDE 50 MG/1
50 TABLET, FILM COATED ORAL EVERY 8 HOURS
Status: DISCONTINUED | OUTPATIENT
Start: 2024-04-11 | End: 2024-04-13 | Stop reason: HOSPADM

## 2024-04-11 RX ORDER — ACETAMINOPHEN 325 MG/1
650 TABLET ORAL EVERY 6 HOURS PRN
Status: DISCONTINUED | OUTPATIENT
Start: 2024-04-11 | End: 2024-04-13 | Stop reason: HOSPADM

## 2024-04-11 RX ORDER — METOPROLOL TARTRATE 25 MG/1
12.5 TABLET ORAL 2 TIMES DAILY
Status: DISCONTINUED | OUTPATIENT
Start: 2024-04-11 | End: 2024-04-13 | Stop reason: HOSPADM

## 2024-04-11 RX ORDER — SODIUM CHLORIDE 0.9 % (FLUSH) 0.9 %
10 SYRINGE (ML) INJECTION
Status: DISCONTINUED | OUTPATIENT
Start: 2024-04-11 | End: 2024-04-12

## 2024-04-11 RX ORDER — HYDROMORPHONE HYDROCHLORIDE 1 MG/ML
0.2 INJECTION, SOLUTION INTRAMUSCULAR; INTRAVENOUS; SUBCUTANEOUS EVERY 5 MIN PRN
Status: CANCELLED | OUTPATIENT
Start: 2024-04-11

## 2024-04-11 RX ORDER — ROCURONIUM BROMIDE 10 MG/ML
INJECTION, SOLUTION INTRAVENOUS
Status: DISCONTINUED | OUTPATIENT
Start: 2024-04-11 | End: 2024-04-11

## 2024-04-11 RX ORDER — SODIUM,POTASSIUM PHOSPHATES 280-250MG
2 POWDER IN PACKET (EA) ORAL
Status: DISCONTINUED | OUTPATIENT
Start: 2024-04-11 | End: 2024-04-13 | Stop reason: HOSPADM

## 2024-04-11 RX ORDER — HEPARIN SODIUM 1000 [USP'U]/ML
INJECTION, SOLUTION INTRAVENOUS; SUBCUTANEOUS
Status: DISCONTINUED | OUTPATIENT
Start: 2024-04-11 | End: 2024-04-11

## 2024-04-11 RX ORDER — SCOLOPAMINE TRANSDERMAL SYSTEM 1 MG/1
1 PATCH, EXTENDED RELEASE TRANSDERMAL
Status: DISCONTINUED | OUTPATIENT
Start: 2024-04-11 | End: 2024-04-11

## 2024-04-11 RX ORDER — LABETALOL HCL 20 MG/4 ML
10 SYRINGE (ML) INTRAVENOUS EVERY 4 HOURS PRN
Status: DISCONTINUED | OUTPATIENT
Start: 2024-04-11 | End: 2024-04-13 | Stop reason: HOSPADM

## 2024-04-11 RX ORDER — ONDANSETRON HYDROCHLORIDE 2 MG/ML
4 INJECTION, SOLUTION INTRAVENOUS ONCE AS NEEDED
Status: CANCELLED | OUTPATIENT
Start: 2024-04-11 | End: 2035-09-07

## 2024-04-11 RX ORDER — FENTANYL CITRATE 50 UG/ML
25 INJECTION, SOLUTION INTRAMUSCULAR; INTRAVENOUS EVERY 5 MIN PRN
OUTPATIENT
Start: 2024-04-11

## 2024-04-11 RX ORDER — CLOPIDOGREL BISULFATE 75 MG/1
75 TABLET ORAL DAILY
Status: DISCONTINUED | OUTPATIENT
Start: 2024-04-11 | End: 2024-04-13 | Stop reason: HOSPADM

## 2024-04-11 RX ORDER — SODIUM CHLORIDE 9 MG/ML
INJECTION, SOLUTION INTRAVENOUS CONTINUOUS
Status: DISCONTINUED | OUTPATIENT
Start: 2024-04-11 | End: 2024-04-12

## 2024-04-11 RX ORDER — LIDOCAINE HYDROCHLORIDE 10 MG/ML
1 INJECTION, SOLUTION EPIDURAL; INFILTRATION; INTRACAUDAL; PERINEURAL ONCE
Status: DISCONTINUED | OUTPATIENT
Start: 2024-04-11 | End: 2024-04-11

## 2024-04-11 RX ORDER — DROPERIDOL 2.5 MG/ML
0.62 INJECTION, SOLUTION INTRAMUSCULAR; INTRAVENOUS ONCE AS NEEDED
Status: CANCELLED | OUTPATIENT
Start: 2024-04-11 | End: 2035-09-07

## 2024-04-11 RX ORDER — SODIUM CHLORIDE 0.9 % (FLUSH) 0.9 %
3 SYRINGE (ML) INJECTION
OUTPATIENT
Start: 2024-04-11

## 2024-04-11 RX ORDER — DEXMEDETOMIDINE HYDROCHLORIDE 100 UG/ML
INJECTION, SOLUTION INTRAVENOUS
Status: DISCONTINUED | OUTPATIENT
Start: 2024-04-11 | End: 2024-04-11

## 2024-04-11 RX ORDER — ONDANSETRON HYDROCHLORIDE 2 MG/ML
INJECTION, SOLUTION INTRAVENOUS
Status: DISCONTINUED | OUTPATIENT
Start: 2024-04-11 | End: 2024-04-11

## 2024-04-11 RX ORDER — HALOPERIDOL 5 MG/ML
0.5 INJECTION INTRAMUSCULAR EVERY 10 MIN PRN
OUTPATIENT
Start: 2024-04-11

## 2024-04-11 RX ORDER — LABETALOL HYDROCHLORIDE 5 MG/ML
INJECTION, SOLUTION INTRAVENOUS
Status: DISCONTINUED | OUTPATIENT
Start: 2024-04-11 | End: 2024-04-11

## 2024-04-11 RX ORDER — DIPHENHYDRAMINE HYDROCHLORIDE 50 MG/ML
INJECTION INTRAMUSCULAR; INTRAVENOUS
Status: DISCONTINUED | OUTPATIENT
Start: 2024-04-11 | End: 2024-04-11

## 2024-04-11 RX ORDER — LIDOCAINE HYDROCHLORIDE 20 MG/ML
INJECTION INTRAVENOUS
Status: DISCONTINUED | OUTPATIENT
Start: 2024-04-11 | End: 2024-04-11

## 2024-04-11 RX ORDER — FENTANYL CITRATE 50 UG/ML
INJECTION, SOLUTION INTRAMUSCULAR; INTRAVENOUS
Status: DISCONTINUED | OUTPATIENT
Start: 2024-04-11 | End: 2024-04-11

## 2024-04-11 RX ORDER — LIDOCAINE AND PRILOCAINE 25; 25 MG/G; MG/G
CREAM TOPICAL ONCE
Status: DISCONTINUED | OUTPATIENT
Start: 2024-04-11 | End: 2024-04-11

## 2024-04-11 RX ORDER — AMOXICILLIN 250 MG
1 CAPSULE ORAL DAILY
Status: DISCONTINUED | OUTPATIENT
Start: 2024-04-12 | End: 2024-04-13 | Stop reason: HOSPADM

## 2024-04-11 RX ORDER — HYDRALAZINE HYDROCHLORIDE 20 MG/ML
10 INJECTION INTRAMUSCULAR; INTRAVENOUS EVERY 4 HOURS PRN
Status: DISCONTINUED | OUTPATIENT
Start: 2024-04-11 | End: 2024-04-13 | Stop reason: HOSPADM

## 2024-04-11 RX ORDER — LOSARTAN POTASSIUM 50 MG/1
50 TABLET ORAL DAILY
Status: DISCONTINUED | OUTPATIENT
Start: 2024-04-12 | End: 2024-04-13 | Stop reason: HOSPADM

## 2024-04-11 RX ORDER — NICARDIPINE HYDROCHLORIDE 0.2 MG/ML
0-15 INJECTION INTRAVENOUS CONTINUOUS
Status: DISCONTINUED | OUTPATIENT
Start: 2024-04-11 | End: 2024-04-12

## 2024-04-11 RX ADMIN — SUGAMMADEX 200 MG: 100 INJECTION, SOLUTION INTRAVENOUS at 06:04

## 2024-04-11 RX ADMIN — HYDRALAZINE HYDROCHLORIDE 10 MG: 20 INJECTION, SOLUTION INTRAMUSCULAR; INTRAVENOUS at 09:04

## 2024-04-11 RX ADMIN — LIDOCAINE HYDROCHLORIDE 50 MG: 20 INJECTION INTRAVENOUS at 05:04

## 2024-04-11 RX ADMIN — DIPHENHYDRAMINE HYDROCHLORIDE 25 MG: 50 INJECTION, SOLUTION INTRAMUSCULAR; INTRAVENOUS at 05:04

## 2024-04-11 RX ADMIN — DEXMEDETOMIDINE 8 MCG: 100 INJECTION, SOLUTION, CONCENTRATE INTRAVENOUS at 06:04

## 2024-04-11 RX ADMIN — NICARDIPINE HYDROCHLORIDE 5 MG/HR: 0.2 INJECTION INTRAVENOUS at 09:04

## 2024-04-11 RX ADMIN — PROPOFOL 200 MG: 10 INJECTION, EMULSION INTRAVENOUS at 05:04

## 2024-04-11 RX ADMIN — NICARDIPINE HYDROCHLORIDE 5 MG/HR: 0.2 INJECTION, SOLUTION INTRAVENOUS at 09:04

## 2024-04-11 RX ADMIN — IOHEXOL 80 ML: 300 INJECTION, SOLUTION INTRAVENOUS at 06:04

## 2024-04-11 RX ADMIN — HEPARIN SODIUM 4800 UNITS: 1000 INJECTION, SOLUTION INTRAVENOUS; SUBCUTANEOUS at 05:04

## 2024-04-11 RX ADMIN — LABETALOL HYDROCHLORIDE 10 MG: 5 INJECTION, SOLUTION INTRAVENOUS at 08:04

## 2024-04-11 RX ADMIN — ROCURONIUM BROMIDE 20 MG: 10 INJECTION INTRAVENOUS at 05:04

## 2024-04-11 RX ADMIN — ACETAMINOPHEN 650 MG: 325 TABLET ORAL at 09:04

## 2024-04-11 RX ADMIN — CLOPIDOGREL BISULFATE 75 MG: 75 TABLET ORAL at 10:04

## 2024-04-11 RX ADMIN — ASPIRIN 81 MG CHEWABLE TABLET 81 MG: 81 TABLET CHEWABLE at 09:04

## 2024-04-11 RX ADMIN — PROPOFOL 30 MG: 10 INJECTION, EMULSION INTRAVENOUS at 06:04

## 2024-04-11 RX ADMIN — FENTANYL CITRATE 50 MCG: 50 INJECTION, SOLUTION INTRAMUSCULAR; INTRAVENOUS at 04:04

## 2024-04-11 RX ADMIN — SODIUM CHLORIDE: 0.9 INJECTION, SOLUTION INTRAVENOUS at 04:04

## 2024-04-11 RX ADMIN — LABETALOL HYDROCHLORIDE 10 MG: 5 INJECTION, SOLUTION INTRAVENOUS at 07:04

## 2024-04-11 RX ADMIN — METOPROLOL TARTRATE 12.5 MG: 25 TABLET, FILM COATED ORAL at 10:04

## 2024-04-11 RX ADMIN — FENTANYL CITRATE 50 MCG: 50 INJECTION, SOLUTION INTRAMUSCULAR; INTRAVENOUS at 05:04

## 2024-04-11 RX ADMIN — AMLODIPINE BESYLATE 10 MG: 10 TABLET ORAL at 09:04

## 2024-04-11 RX ADMIN — LABETALOL HYDROCHLORIDE 10 MG: 5 INJECTION, SOLUTION INTRAVENOUS at 06:04

## 2024-04-11 RX ADMIN — HYDRALAZINE HYDROCHLORIDE 50 MG: 50 TABLET ORAL at 10:04

## 2024-04-11 RX ADMIN — ONDANSETRON 4 MG: 2 INJECTION INTRAMUSCULAR; INTRAVENOUS at 06:04

## 2024-04-11 RX ADMIN — ROCURONIUM BROMIDE 50 MG: 10 INJECTION INTRAVENOUS at 05:04

## 2024-04-11 NOTE — ANESTHESIA PREPROCEDURE EVALUATION
04/11/2024  Faviola Coughlin is a 73 y.o., female.      Pre-op Assessment    I have reviewed the Patient Summary Reports.     I have reviewed the Nursing Notes. I have reviewed the NPO Status.   I have reviewed the Medications.     Review of Systems  Anesthesia Hx:  No problems with previous Anesthesia   History of prior surgery of interest to airway management or planning:          Denies Family Hx of Anesthesia complications.    Denies Personal Hx of Anesthesia complications.                    Hematology/Oncology:    Oncology Normal                Hematology Comments: FV L, hx of DVT                    EENT/Dental:  EENT/Dental Normal           Cardiovascular:  Exercise tolerance: good   Hypertension              ECG has been reviewed.                          Pulmonary:  Pulmonary Normal                       Renal/:  Renal/ Normal                 Hepatic/GI:  Hepatic/GI Normal                 Musculoskeletal:  Musculoskeletal Normal                Neurological:   CVA, residual symptoms                                    Endocrine:  Endocrine Normal            Dermatological:  Skin Normal    Psych:  Psychiatric Normal                    Physical Exam  General: Well nourished, Cooperative, Alert and Oriented    Airway:  Mallampati: II   Mouth Opening: Normal  TM Distance: Normal  Tongue: Normal  Neck ROM: Normal ROM    Dental:  Intact        Anesthesia Plan  Type of Anesthesia, risks & benefits discussed:    Anesthesia Type: Gen ETT  Intra-op Monitoring Plan: Standard ASA Monitors and Art Line  Post Op Pain Control Plan: multimodal analgesia and IV/PO Opioids PRN  Induction:  IV  Airway Plan: Direct, Post-Induction  Informed Consent: Informed consent signed with the Patient and all parties understand the risks and agree with anesthesia plan.  All questions answered.   ASA Score: 3  Day of Surgery  Review of History & Physical: H&P Update referred to the surgeon/provider.    Ready For Surgery From Anesthesia Perspective.     .

## 2024-04-11 NOTE — H&P
Interventional Neuroradiology Pre-procedure Note    Procedure: Diagnostic cerebral angiogram aneurysm embolization using flow diversion    History of Present Illness:  Faviola Coughlin is a 73 y.o. female who presents for R ICA supraclinoid aneurysm emvolization.      ROS:   Hematological: no known coagulopathies  Respiratory: no shortness of breath  Cardiovascular: no chest pain  Gastrointestinal: no abdominal pain  Genito-Urinary: no dysuria  Musculoskeletal: negative  Neurological: no TIA or stroke symptoms     Scheduled Meds:    LIDOcaine (PF) 10 mg/ml (1%)  1 mL Other Once    LIDOcaine-prilocaine   Topical (Top) Once     Current Meds:   Current Outpatient Medications:     amLODIPine (NORVASC) 10 MG tablet, Take 1 tablet by mouth every morning., Disp: , Rfl:     apixaban (ELIQUIS) 2.5 mg Tab, Take by mouth 2 (two) times daily., Disp: , Rfl:     ascorbic acid, vitamin C, (VITAMIN C) 100 MG tablet, Take 1 tablet by mouth every morning., Disp: , Rfl:     aspirin (ECOTRIN) 81 MG EC tablet, Take 1 tablet (81 mg total) by mouth once daily. Pt to take otc. To be taken with Plavix, Disp: 30 tablet, Rfl: 0    baclofen (LIORESAL) 10 MG tablet, Take 10 mg by mouth nightly as needed., Disp: , Rfl:     benazepriL (LOTENSIN) 20 MG tablet, Take 1 tablet by mouth once daily., Disp: , Rfl:     diphenhydrAMINE (BENADRYL) 50 MG capsule, Pt to take 50 mg benadryl at 1 hour before procedure, Disp: 1 capsule, Rfl: 0    hydrALAZINE (APRESOLINE) 25 MG tablet, Take 25 mg by mouth every 8 (eight) hours as needed., Disp: , Rfl:     hydroCHLOROthiazide (MICROZIDE) 12.5 mg capsule, Take 1 capsule by mouth once daily., Disp: , Rfl:     ipratropium (ATROVENT) 42 mcg (0.06 %) nasal spray, USE 2 SPRAYS IN EACH NOSTRIL THREE TIMES DAILY FOR RUNNY NOSE, Disp: , Rfl:     losartan (COZAAR) 50 MG tablet, Take 1 tablet by mouth once daily., Disp: , Rfl:     melatonin (MELATIN) 5 mg, Take 3 mg by mouth nightly as needed., Disp: , Rfl:     metoprolol  succinate (TOPROL-XL) 25 MG 24 hr tablet, Take 1 tablet by mouth once daily., Disp: , Rfl:     ondansetron (ZOFRAN) 4 MG tablet, Take 4 mg by mouth 4 (four) times daily as needed., Disp: , Rfl:     pantoprazole (PROTONIX) 40 MG tablet, Take 40 mg by mouth daily as needed., Disp: , Rfl:     predniSONE (DELTASONE) 50 MG Tab, Pt to take 1 tab (50mg total) 13 hours, 7 hours, 1 hour before procedure in addition take over the counter benadryl 50 mg 1 hr prior to procedure, Disp: 3 tablet, Rfl: 0    ticagrelor (BRILINTA) 90 mg tablet, Take 1 tablet (90 mg total) by mouth 2 (two) times daily., Disp: 60 tablet, Rfl: 11    vitamin D (VITAMIN D3) 1000 units Tab, Take 1 tablet by mouth every morning., Disp: , Rfl:     Current Facility-Administered Medications:     0.9%  NaCl infusion, , Intravenous, Continuous, Yasmin Santos PA-C    LIDOcaine (PF) 10 mg/ml (1%) injection 10 mg, 1 mL, Other, Once, Yasmin Santos PA-C    LIDOcaine-prilocaine cream, , Topical (Top), Once, Yasmin Santos PA-C   Continuous Infusions:    sodium chloride 0.9%       PRN Meds:    Allergies:   Review of patient's allergies indicates:   Allergen Reactions    Iodinated contrast media Rash     Sedation Hx: No adverse events.    Labs:              Objective:  Vitals: not currently breastfeeding.     Physical Exam:  General: well developed, well nourished, no distress.   Head: normocephalic, atraumatic  Neck: No tracheal deviation. No palpable masses. Full ROM.   Neurologic: Alert and oriented. Thought content appropriate.  GCS: E4 V5 M6; Total: 15  Language: No aphasia  Speech: No dysarthria  Cranial nerves: face symmetric, tongue midline, CN II-XII grossly intact.   Eyes: pupils equal, round, reactive to light with accomodation, EOMI.   Pulmonary: normal respirations, no signs of respiratory distress  Abdomen: soft, non-distended, not tender to palpation  Vascular: Pulses 2+ and symmetric radial and dorsalis pedis. No LE edema.   Skin: Skin is  warm, dry and intact.  Sensory: intact to light touch throughout  Motor Strength: Moves all extremities spontaneously with good tone.  Full strength upper and lower extremities. No abnormal movements seen.     ASA: 2  MAL: 2    Plan:  -Plan for cerebral angiogram with aneurysm embolization using flow diversion  -Sedation Plan:General anesthesia  -All diagnostics and imaging reviewed  -Patient NPO since MN  -Risks & benefits of procedure explained in detail; patient consented and all questions answered  -Further reccs to follow procedure          Renato Barrientos MD, MHA  Fellow, NeuroEndovascular Surgery, [facility]  Neurologist, [facility] [place], [place]

## 2024-04-11 NOTE — ANESTHESIA PROCEDURE NOTES
Intubation    Date/Time: 4/11/2024 5:01 PM    Performed by: Garland Armenta CRNA  Authorized by: Joshua Lima MD    Intubation:     Induction:  Intravenous    Intubated:  Postinduction    Mask Ventilation:  Easy mask    Attempts:  1    Attempted By:  CRNA    Method of Intubation:  Video laryngoscopy    Blade:  Steele 3    Laryngeal View Grade: Grade I - full view of cords      Difficult Airway Encountered?: No      Complications:  None    Airway Device:  Oral endotracheal tube    Airway Device Size:  7.0    Style/Cuff Inflation:  Cuffed (inflated to minimal occlusive pressure)    Tube secured:  21    Secured at:  The lips    Placement Verified By:  Capnometry and Fiber optic visualization    Complicating Factors:  None    Findings Post-Intubation:  BS equal bilateral and atraumatic/condition of teeth unchanged

## 2024-04-11 NOTE — PLAN OF CARE
Pt arrived to IR 4  for Cerebral angiogram with aneurysm embolization (Pipeline). Pt oriented to unit and staff. Plan of care reviewed with patient, patient verbalizes understanding. Comfort measures utilized. Pt safely transferred from stretcher to procedural table. Fall risk reviewed with patient, fall risk interventions maintained. Positioner pillows utilized to minimize pressure points. Blankets applied. Pt prepped and draped utilizing standard sterile technique. Timeouts completed utilizing standard universal time-out, per department and facility policy.  Anesthesia at bedside; Refer to anesthesia record regarding sedation and vital signs.

## 2024-04-11 NOTE — PLAN OF CARE
Patient arrived to room. PIV placed,. Admit assessment completed. Plan of care discussed with patient. Call light in reach. Family at bedside.

## 2024-04-12 LAB
ALBUMIN SERPL BCP-MCNC: 3.3 G/DL (ref 3.5–5.2)
ALP SERPL-CCNC: 73 U/L (ref 55–135)
ALT SERPL W/O P-5'-P-CCNC: 5 U/L (ref 10–44)
ANION GAP SERPL CALC-SCNC: 9 MMOL/L (ref 8–16)
AST SERPL-CCNC: 15 U/L (ref 10–40)
BASOPHILS # BLD AUTO: 0.04 K/UL (ref 0–0.2)
BASOPHILS NFR BLD: 0.2 % (ref 0–1.9)
BILIRUB SERPL-MCNC: 0.9 MG/DL (ref 0.1–1)
BUN SERPL-MCNC: 15 MG/DL (ref 8–23)
CALCIUM SERPL-MCNC: 9 MG/DL (ref 8.7–10.5)
CHLORIDE SERPL-SCNC: 109 MMOL/L (ref 95–110)
CO2 SERPL-SCNC: 23 MMOL/L (ref 23–29)
CREAT SERPL-MCNC: 0.9 MG/DL (ref 0.5–1.4)
DIFFERENTIAL METHOD BLD: ABNORMAL
EOSINOPHIL # BLD AUTO: 0 K/UL (ref 0–0.5)
EOSINOPHIL NFR BLD: 0 % (ref 0–8)
ERYTHROCYTE [DISTWIDTH] IN BLOOD BY AUTOMATED COUNT: 13.1 % (ref 11.5–14.5)
EST. GFR  (NO RACE VARIABLE): >60 ML/MIN/1.73 M^2
GLUCOSE SERPL-MCNC: 121 MG/DL (ref 70–110)
HCT VFR BLD AUTO: 33.7 % (ref 37–48.5)
HGB BLD-MCNC: 11.7 G/DL (ref 12–16)
IMM GRANULOCYTES # BLD AUTO: 0.14 K/UL (ref 0–0.04)
IMM GRANULOCYTES NFR BLD AUTO: 0.7 % (ref 0–0.5)
LYMPHOCYTES # BLD AUTO: 2.4 K/UL (ref 1–4.8)
LYMPHOCYTES NFR BLD: 12.5 % (ref 18–48)
MAGNESIUM SERPL-MCNC: 2.2 MG/DL (ref 1.6–2.6)
MCH RBC QN AUTO: 33 PG (ref 27–31)
MCHC RBC AUTO-ENTMCNC: 34.7 G/DL (ref 32–36)
MCV RBC AUTO: 95 FL (ref 82–98)
MONOCYTES # BLD AUTO: 1.7 K/UL (ref 0.3–1)
MONOCYTES NFR BLD: 9 % (ref 4–15)
NEUTROPHILS # BLD AUTO: 14.9 K/UL (ref 1.8–7.7)
NEUTROPHILS NFR BLD: 77.6 % (ref 38–73)
NRBC BLD-RTO: 0 /100 WBC
OHS QRS DURATION: 68 MS
OHS QTC CALCULATION: 469 MS
PHOSPHATE SERPL-MCNC: 3.1 MG/DL (ref 2.7–4.5)
PLATELET # BLD AUTO: 286 K/UL (ref 150–450)
PMV BLD AUTO: 11 FL (ref 9.2–12.9)
POTASSIUM SERPL-SCNC: 3.5 MMOL/L (ref 3.5–5.1)
PROT SERPL-MCNC: 6.5 G/DL (ref 6–8.4)
RBC # BLD AUTO: 3.55 M/UL (ref 4–5.4)
SODIUM SERPL-SCNC: 141 MMOL/L (ref 136–145)
WBC # BLD AUTO: 19.16 K/UL (ref 3.9–12.7)

## 2024-04-12 PROCEDURE — 97112 NEUROMUSCULAR REEDUCATION: CPT

## 2024-04-12 PROCEDURE — 25000003 PHARM REV CODE 250

## 2024-04-12 PROCEDURE — 97166 OT EVAL MOD COMPLEX 45 MIN: CPT

## 2024-04-12 PROCEDURE — 20000000 HC ICU ROOM

## 2024-04-12 PROCEDURE — 85025 COMPLETE CBC W/AUTO DIFF WBC: CPT

## 2024-04-12 PROCEDURE — 94761 N-INVAS EAR/PLS OXIMETRY MLT: CPT

## 2024-04-12 PROCEDURE — 97530 THERAPEUTIC ACTIVITIES: CPT

## 2024-04-12 PROCEDURE — 99233 SBSQ HOSP IP/OBS HIGH 50: CPT | Mod: GC,,, | Performed by: PSYCHIATRY & NEUROLOGY

## 2024-04-12 PROCEDURE — 84100 ASSAY OF PHOSPHORUS: CPT

## 2024-04-12 PROCEDURE — 83735 ASSAY OF MAGNESIUM: CPT

## 2024-04-12 PROCEDURE — 97162 PT EVAL MOD COMPLEX 30 MIN: CPT

## 2024-04-12 PROCEDURE — 80053 COMPREHEN METABOLIC PANEL: CPT

## 2024-04-12 RX ORDER — DULOXETIN HYDROCHLORIDE 30 MG/1
30 CAPSULE, DELAYED RELEASE ORAL DAILY
COMMUNITY
Start: 2023-12-07

## 2024-04-12 RX ORDER — BACLOFEN 5 MG/1
5 TABLET ORAL 2 TIMES DAILY PRN
Status: DISCONTINUED | OUTPATIENT
Start: 2024-04-12 | End: 2024-04-13 | Stop reason: HOSPADM

## 2024-04-12 RX ORDER — SODIUM CHLORIDE 9 MG/ML
INJECTION, SOLUTION INTRAVENOUS CONTINUOUS
Status: CANCELLED | OUTPATIENT
Start: 2024-04-12

## 2024-04-12 RX ORDER — AMLODIPINE BESYLATE 2.5 MG/1
2.5 TABLET ORAL DAILY
COMMUNITY

## 2024-04-12 RX ORDER — CALCIUM CARBONATE 160(400)MG
1 TABLET,CHEWABLE ORAL DAILY
Qty: 1 EACH | Refills: 0 | Status: SHIPPED | OUTPATIENT
Start: 2024-04-12

## 2024-04-12 RX ORDER — CETIRIZINE HYDROCHLORIDE 10 MG/1
10 TABLET ORAL DAILY
COMMUNITY
Start: 2023-12-15 | End: 2024-12-14

## 2024-04-12 RX ORDER — CLOPIDOGREL BISULFATE 75 MG/1
75 TABLET ORAL DAILY
Qty: 30 TABLET | Refills: 11 | Status: SHIPPED | OUTPATIENT
Start: 2024-04-13 | End: 2025-04-13

## 2024-04-12 RX ORDER — SODIUM CHLORIDE 9 MG/ML
INJECTION, SOLUTION INTRAVENOUS CONTINUOUS
Status: DISCONTINUED | OUTPATIENT
Start: 2024-04-12 | End: 2024-04-13 | Stop reason: HOSPADM

## 2024-04-12 RX ORDER — LIDOCAINE 50 MG/G
1 PATCH TOPICAL
Status: DISCONTINUED | OUTPATIENT
Start: 2024-04-12 | End: 2024-04-13 | Stop reason: HOSPADM

## 2024-04-12 RX ORDER — DICLOFENAC SODIUM 10 MG/G
2 GEL TOPICAL 3 TIMES DAILY
Status: DISCONTINUED | OUTPATIENT
Start: 2024-04-12 | End: 2024-04-13 | Stop reason: HOSPADM

## 2024-04-12 RX ORDER — ACETAMINOPHEN 500 MG
1000 TABLET ORAL ONCE
Status: COMPLETED | OUTPATIENT
Start: 2024-04-12 | End: 2024-04-12

## 2024-04-12 RX ADMIN — DOCUSATE SODIUM AND SENNOSIDES 1 TABLET: 8.6; 5 TABLET, FILM COATED ORAL at 08:04

## 2024-04-12 RX ADMIN — APIXABAN 2.5 MG: 2.5 TABLET, FILM COATED ORAL at 08:04

## 2024-04-12 RX ADMIN — POTASSIUM BICARBONATE 50 MEQ: 978 TABLET, EFFERVESCENT ORAL at 06:04

## 2024-04-12 RX ADMIN — SODIUM CHLORIDE: 0.9 INJECTION, SOLUTION INTRAVENOUS at 01:04

## 2024-04-12 RX ADMIN — LIDOCAINE 5% 1 PATCH: 700 PATCH TOPICAL at 01:04

## 2024-04-12 RX ADMIN — AMLODIPINE BESYLATE 10 MG: 10 TABLET ORAL at 08:04

## 2024-04-12 RX ADMIN — ASPIRIN 81 MG CHEWABLE TABLET 81 MG: 81 TABLET CHEWABLE at 08:04

## 2024-04-12 RX ADMIN — ACETAMINOPHEN 1000 MG: 500 TABLET ORAL at 04:04

## 2024-04-12 RX ADMIN — DICLOFENAC SODIUM 2 G: 10 GEL TOPICAL at 05:04

## 2024-04-12 RX ADMIN — HYDRALAZINE HYDROCHLORIDE 50 MG: 50 TABLET ORAL at 06:04

## 2024-04-12 RX ADMIN — DICLOFENAC SODIUM 2 G: 10 GEL TOPICAL at 08:04

## 2024-04-12 RX ADMIN — METOPROLOL TARTRATE 12.5 MG: 25 TABLET, FILM COATED ORAL at 08:04

## 2024-04-12 RX ADMIN — HYDRALAZINE HYDROCHLORIDE 50 MG: 50 TABLET ORAL at 01:04

## 2024-04-12 RX ADMIN — ACETAMINOPHEN 650 MG: 325 TABLET ORAL at 07:04

## 2024-04-12 RX ADMIN — HYDRALAZINE HYDROCHLORIDE 50 MG: 50 TABLET ORAL at 09:04

## 2024-04-12 RX ADMIN — LOSARTAN POTASSIUM 50 MG: 50 TABLET, FILM COATED ORAL at 08:04

## 2024-04-12 RX ADMIN — CLOPIDOGREL BISULFATE 75 MG: 75 TABLET ORAL at 08:04

## 2024-04-12 RX ADMIN — BACLOFEN 5 MG: 5 TABLET ORAL at 10:04

## 2024-04-12 RX ADMIN — BACLOFEN 5 MG: 5 TABLET ORAL at 05:04

## 2024-04-12 NOTE — NURSING
Pt was brought to the unit post coiling and the angio site was clean, intact and dry. However an hour later the site had become saturated and needed pressure to be held via the sandbag. The pt foot is warm with a good pulse in the extremity. The site is soft with no hematoma. There is some light bruising at the insertion site.

## 2024-04-12 NOTE — PT/OT/SLP EVAL
Physical Therapy Co-Evaluation with OT     Patient Name:  Faviola Coughlin   MRN:  [id number]    Co-evaluation with OT performed due to patient complexity and deficits, requiring two skilled therapists to appropriately and safely assess patient's strength and endurance while facilitating functional tasks in addition to accommodating for patient's activity tolerance.    Recommendations:     Discharge Recommendations: Low Intensity Therapy   Discharge Equipment Recommendations: rollator, bedside commode Justification for Walker HME   The mobility limitation cannot be sufficiently resolved by the use of a cane.   Patient's functional mobility deficit can be sufficiently resolved with the use of a rollator.  Patient's mobility limitation significantly impairs their ability to participate in one of more activities of daily living.  The use of a rollator will significantly improve the patients ability to participate in MRADLS and the patient will use it on regular basis in the home.     Barriers to discharge: None    Assessment:     Faviola Coughlin is a 73 y.o. female admitted with a medical diagnosis of Aneurysm of internal carotid artery.  She presents with the following impairments/functional limitations: weakness, impaired functional mobility, gait instability, decreased lower extremity function, pain. Patient required CGA for sit to stand transfers and Lisa for ambulation with 2ww. At baseline patient requires assistance with bed mobility and some ADLs however is not at her baseline functioning at this time. Recommending low intensity post-acute therapy along with the equipment listed above to improve safety at home and address deficits. Discussed discharge recommendations at length with patient/daughter as patient would be appropriate for placement, however would benefit from low intensity with equipment as patient's daughter assists at home.   Patient is safe to ambulate with nursing (2ww assist of 1), encouraged  "to sit up in chair when able.      Rehab Prognosis: Good; patient would benefit from acute skilled PT services to address these deficits and reach maximum level of function.    Recent Surgery: * No procedures listed * 1 Day Post-Op    Plan:     During this hospitalization, patient to be seen 3 x/week to address the identified rehab impairments via gait training, therapeutic activities, therapeutic exercises, neuromuscular re-education, wheelchair management/training and progress toward the following goals:    Plan of Care Expires:  05/12/24    Subjective     Chief Complaint: not stated  Patient/Family Comments/goals: "I know I have to be extra careful at home".   Pain/Comfort:  Pain Rating 1: 0/10  Pain Rating Post-Intervention 1: 0/10    Patients cultural, spiritual, Yazidi conflicts given the current situation: no    Living Environment:  Patient lives with daughter in [facility] with 0STE, [facility] with bench.  Prior to admission, patients level of function was requires assistance with bed mobility and some ADLs.  Equipment used at home: other (see comments) (rollator, wheelchair, shower chair) patient reported that her rollator is very old and she received it from a family member, the brakes do not work.  DME owned (not currently used): hospital bed.  Upon discharge, patient will have assistance from daughter.    Objective:     Communicated with RN prior to session.  Patient found HOB elevated with Other (comments) (purewick, bloco pressure cuff, peripheral IV, telemetry, pulse ox)  upon PT entry to room. Daughter in room.    General Precautions: Standard, fall  Orthopedic Precautions:N/A   Braces: N/A  Respiratory Status: Room air    Exams:  Cognitive Exam:  Patient is oriented to Person, Place, Time, and Situation  Sensation:    -       Intact  RLE ROM: WFL  RLE Strength: WFL  LLE ROM: WFL  LLE Strength: WFL  MMT: Knee extension, ankle DF/PF      Functional Mobility:  Bed Mobility:     Scooting: contact guard " assistance  Supine to Sit: moderate assistance  Sit to Supine: contact guard assistance  Transfers:     Sit to Stand:  contact guard assistance with 2ww  Gait: patient ambulated 12' with Lisa with 2ww  Assist for walker management and cues for foot placement with turns  Balance:   Sitting static: supervision  Sitting dynamic: SBA  Standing static: CGA with 2ww  Standing dynamic: CGA       AM-PAC 6 CLICK MOBILITY  Total Score:20       Treatment & Education:  Education: patient educated on POC, need for therapy, safety with mobility, discharge recommendations and equipment recommendations. Patient verbalized understanding of all topics.   Verbal and tactile cues with assist during STS transfer for optimal FRANDY prior to transfer, forward weight shift to initiate, to engage LE mm, extend hips forward and bring head and chest up to achieve full upright stance.   Verbal and tactile cues provided during mobility for walker management and safety including hand placement on walker vs chair during transfers and positioning of walker in relation to body.     Patient left HOB elevated with all lines intact, call button in reach, RN notified, and daughter present.    GOALS:   Multidisciplinary Problems       Physical Therapy Goals          Problem: Physical Therapy    Goal Priority Disciplines Outcome Goal Variances Interventions   Physical Therapy Goal     PT, PT/OT Ongoing, Progressing     Description: Goals to be met by: 24     Patient will increase functional independence with mobility by performin. Supine to sit with MInimal Assistance  2. Sit to supine with Stand-by Assistance  3. Sit to stand transfer with Supervision  4. Bed to chair transfer with Supervision using Rolling Walker  5. Gait  x 50 feet with Supervision using Rolling Walker.                          History:     Past Medical History:   Diagnosis Date    Arthritis     Hypertension     Stroke        Past Surgical History:   Procedure Laterality  Date    CHOLECYSTECTOMY 2022       Time Tracking:     PT Received On: 04/12/24  PT Start Time: 1342     PT Stop Time: 1414  PT Total Time (min): 32 min     Billable Minutes: Evaluation 15 minutes and Therapeutic Activity 17 minutes      04/12/2024

## 2024-04-12 NOTE — TRANSFER OF CARE
"Anesthesia Transfer of Care Note    Patient: Faviola Coughlin    Procedure(s) Performed: * No procedures listed *    Patient location: ICU    Anesthesia Type: general    Transport from OR: Transported from OR on 6-10 L/min O2 by face mask with adequate spontaneous ventilation. Continuous ECG monitoring in transport. Continuous SpO2 monitoring in transport    Post pain: adequate analgesia    Post assessment: no apparent anesthetic complications and tolerated procedure well    Post vital signs: stable    Level of consciousness: awake and alert    Nausea/Vomiting: no nausea/vomiting    Complications: none    Transfer of care protocol was followed      Last vitals: Visit Vitals  BP (!) 153/73   Pulse 82   Temp 36.4 °C (97.5 °F) (Temporal)   Resp 18   Ht 5' 5" (1.651 m)   Wt 79.4 kg (175 lb)   SpO2 96%   Breastfeeding No   BMI 29.12 kg/m²     "

## 2024-04-12 NOTE — SUBJECTIVE & OBJECTIVE
Past Medical History:   Diagnosis Date    Arthritis     Hypertension     Stroke      Past Surgical History:   Procedure Laterality Date    CHOLECYSTECTOMY  2022      No current facility-administered medications on file prior to encounter.     Current Outpatient Medications on File Prior to Encounter   Medication Sig Dispense Refill    amLODIPine (NORVASC) 10 MG tablet Take 1 tablet by mouth every morning.      apixaban (ELIQUIS) 2.5 mg Tab Take by mouth 2 (two) times daily.      aspirin (ECOTRIN) 81 MG EC tablet Take 1 tablet (81 mg total) by mouth once daily. Pt to take otc. To be taken with Plavix 30 tablet 0    baclofen (LIORESAL) 10 MG tablet Take 10 mg by mouth nightly as needed.      benazepriL (LOTENSIN) 20 MG tablet Take 1 tablet by mouth once daily.      diphenhydrAMINE (BENADRYL) 50 MG capsule Pt to take 50 mg benadryl at 1 hour before procedure 1 capsule 0    hydroCHLOROthiazide (MICROZIDE) 12.5 mg capsule Take 1 capsule by mouth once daily.      melatonin (MELATIN) 5 mg Take 3 mg by mouth nightly as needed.      metoprolol succinate (TOPROL-XL) 25 MG 24 hr tablet Take 1 tablet by mouth once daily.      predniSONE (DELTASONE) 50 MG Tab Pt to take 1 tab (50mg total) 13 hours, 7 hours, 1 hour before procedure in addition take over the counter benadryl 50 mg 1 hr prior to procedure 3 tablet 0    ticagrelor (BRILINTA) 90 mg tablet Take 1 tablet (90 mg total) by mouth 2 (two) times daily. 60 tablet 11    ascorbic acid, vitamin C, (VITAMIN C) 100 MG tablet Take 1 tablet by mouth every morning.      hydrALAZINE (APRESOLINE) 25 MG tablet Take 25 mg by mouth every 8 (eight) hours as needed.      ipratropium (ATROVENT) 42 mcg (0.06 %) nasal spray USE 2 SPRAYS IN EACH NOSTRIL THREE TIMES DAILY FOR RUNNY NOSE      losartan (COZAAR) 50 MG tablet Take 1 tablet by mouth once daily.      ondansetron (ZOFRAN) 4 MG tablet Take 4 mg by mouth 4 (four) times daily as needed.      pantoprazole (PROTONIX) 40 MG tablet Take 40  mg by mouth daily as needed.      vitamin D (VITAMIN D3) 1000 units Tab Take 1 tablet by mouth every morning.        Allergies: Iodinated contrast media  History reviewed. No pertinent family history.  Social History     Tobacco Use    Smoking status: Never    Smokeless tobacco: Never   Substance Use Topics    Alcohol use: Never    Drug use: Never     Review of Systems   HENT:  Positive for sore throat.    Neurological:  Positive for weakness. Negative for dizziness, facial asymmetry, speech difficulty, numbness and headaches.     Objective:     Vitals:    Temp: 97.5 °F (36.4 °C)  Pulse: 91  BP: (!) 150/78  MAP (mmHg): 107  Resp: (!) 21  SpO2: 95 %    Temp  Min: 97.5 °F (36.4 °C)  Max: 97.5 °F (36.4 °C)  Pulse  Min: 82  Max: 91  BP  Min: 150/78  Max: 153/73  MAP (mmHg)  Min: 105  Max: 107  Resp  Min: 18  Max: 21  SpO2  Min: 95 %  Max: 96 %    No intake/output data recorded.            Physical Exam  HENT:      Head: Normocephalic and atraumatic.   Cardiovascular:      Rate and Rhythm: Normal rate and regular rhythm.   Pulmonary:      Effort: Pulmonary effort is normal. No respiratory distress.      Comments: Simple face mask  Neurological:      Mental Status: She is alert.      Comments: E4V5M6  AAOx3  No aphasia or dysarthria  PERRL  Pupils brisk  FC x 4  SILT              Today I personally reviewed pertinent medications, lines/drains/airways, imaging, cardiology results, laboratory results, microbiology results, notably:

## 2024-04-12 NOTE — NURSING
Patient arrived to [facility] from IR   Report received from: IR RN/CRNA    Type of stroke/diagnosis: Pipeline Embolization      End Time: 1900, R groin site     Current symptoms: A&O x4, Following commands in all extremities, PERRL, R groin site c.d.I. 2+pulse in R foot, Flat for 2 hrs post procedure     Skin Assessment done: Yes  Wounds noted: R groin site, generalized bruising, Large bruise Left hip and Left side, mild redness to sacrum   *If wounds noted, was Wound Care consulted? Yes   *If wounds noted, LDA placed? yes  Skin Assessment Verified by:  RAN Blount RN Massey Completed? Pending, waking up from anesthesia     Patient Belongings on Admit: With Family    NCC notified: SCARLET [place]

## 2024-04-12 NOTE — ASSESSMENT & PLAN NOTE
SBP < 140 in acute post-IR setting  PRN labetalol, hydralazine  Resume home medications as appropriate

## 2024-04-12 NOTE — PLAN OF CARE
Problem: Physical Therapy  Goal: Physical Therapy Goal  Description: Goals to be met by: 24     Patient will increase functional independence with mobility by performin. Supine to sit with MInimal Assistance  2. Sit to supine with Stand-by Assistance  3. Sit to stand transfer with Supervision  4. Bed to chair transfer with Supervision using Rolling Walker  5. Gait  x 50 feet with Supervision using Rolling Walker.     Outcome: Ongoing, Progressing   PT eval completed and goals established. Pt will begin PT POC, progressing as tolerated.

## 2024-04-12 NOTE — SUBJECTIVE & OBJECTIVE
Interval History:  NAEON, AF, HDS.    Tolerated procedure well without immediate complication, no complications during POD1.     Plans for discharge tomorrow AM.    Review of Systems   HENT:  Positive for sore throat.    Neurological:  Positive for weakness. Negative for dizziness, facial asymmetry, speech difficulty, numbness and headaches.       Objective:     Vitals:  Temp: 98.4 °F (36.9 °C)  Pulse: 86  Rhythm: normal sinus rhythm  BP: (!) 140/65  MAP (mmHg): 93  Resp: (!) 26  SpO2: 97 %    Temp  Min: 96.9 °F (36.1 °C)  Max: 98.4 °F (36.9 °C)  Pulse  Min: 78  Max: 99  BP  Min: 121/58  Max: 170/76  MAP (mmHg)  Min: 83  Max: 114  Resp  Min: 19  Max: 33  SpO2  Min: 95 %  Max: 100 %    04/11 0701 - 04/12 0700  In: 1077.2 [I.V.:477.2]  Out: 700 [Urine:700]            Physical Exam  HENT:      Head: Normocephalic and atraumatic.   Cardiovascular:      Rate and Rhythm: Normal rate and regular rhythm.   Pulmonary:      Effort: Pulmonary effort is normal. No respiratory distress.   Neurological:      Mental Status: She is alert.      Comments: E4V5M6  AAOx3  No aphasia or dysarthria  PERRL  Pupils brisk  FC x 4  SILT              Medications:  Continuoussodium chloride 0.9%, Last Rate: 75 mL/hr at 04/12/24 1502    ScheduledamLODIPine, 10 mg, Daily  apixaban, 2.5 mg, BID  aspirin, 81 mg, Daily  clopidogreL, 75 mg, Daily  diclofenac sodium, 2 g, TID  hydrALAZINE, 50 mg, Q8H  LIDOcaine, 1 patch, Q24H  losartan, 50 mg, Daily  metoprolol tartrate, 12.5 mg, BID  senna-docusate 8.6-50 mg, 1 tablet, Daily    PRNacetaminophen, 650 mg, Q6H PRN  hydrALAZINE, 10 mg, Q4H PRN  labetalol, 10 mg, Q4H PRN  magnesium oxide, 800 mg, PRN  magnesium oxide, 800 mg, PRN  ondansetron, 4 mg, Q6H PRN  potassium bicarbonate, 35 mEq, PRN  potassium bicarbonate, 50 mEq, PRN  potassium bicarbonate, 60 mEq, PRN  potassium, sodium phosphates, 2 packet, PRN  potassium, sodium phosphates, 2 packet, PRN  potassium, sodium phosphates, 2 packet,  PRN      Today I personally reviewed pertinent medications, lines/drains/airways, imaging, cardiology results, laboratory results, microbiology results, notably:    Diet  Diet Adult Regular (IDDSI Level 7)  Diet Adult Regular (IDDSI Level 7)

## 2024-04-12 NOTE — ASSESSMENT & PLAN NOTE
72 y/o F presents to [facility] s/p elective pipeline flow diversion assisted coiling and angioplasty within the flow diverter of saccular R supraclinoid segment aneurysm and two R cavernous segment saccular aneurysms of the R ICA.     -Admitted to [facility]  -NeuroIR following  -q1h neuro checks, vital checks  -Daily CBC, CMP, mag, phos while in hospital  -SBP < 140, prn labetalol and hydralazine  -Resume DAPT  -SCDs, apixiban for hx of Factor V Leiden  -PT/OT/SLP as appropriate   - recommending low-intensity with home health

## 2024-04-12 NOTE — PROCEDURES
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  The ICA arteries were subselected and angiography of the brain was performed after injection into each of these vessels that revealed previously described saccular supraclinoid segment (bigger and wide neck) and two cavernous segment saccular aneurysms (smaller).      Preliminary intervention: Pipeline flow diversion assisted coiling followed by angioplasty inside the flow diverter.  Please see Imaging report for full details.    A right femoral artery angiogram was performed, the sheath removed and hemostasis achieved using Perclose.  No hematoma was present at the time of hemostasis.    Devices used: Penumbra neuronmax 087, 5F Kyle, 0.038 glide exchange, SL 10, 2 coils, 4F phenom plus, phenom 27, sychro 0.014, pipeline shield flow diverter (4mm X 25mm), scepter 4 X 10 mm balloon  The patient tolerated the procedure well.     Plan:  -To [facility] for monitoring for 24h  -Bed rest for 2h  -Groin check and pulse check q2h   -DAPT (ASA 81 + Plavix 75) OD for 6 months  -Avoid carrying heavy weights > 10 lbs x 24 hrs   -Remove groin dressing tomorrow                     Renato Barrientos MD, A  Fellow, NeuroEndovascular Surgery, [facility] [place] [facility]  Neurologist, [facility] [place], [place]

## 2024-04-12 NOTE — HOSPITAL COURSE
04/12/2024: 72 y/o F with a PMHx of HTN, Factor V Leiden c/b DVT, and R ICA aneurysms who presents to [facility] s/p elective Neuro IR to secure R ICA aneurysms.  She underwent a pipeline flow diversion assisted coiling and angioplasty within the flow diverter of saccular R supraclinoid segment aneurysm and two R cavernous segment saccular aneurysms of the R ICA. Tolerated procedure well without immediate complication, no complications during POD1. Stable for discharge but DME not able to be delivered  to house, and this was not discovered until too late in shift to step down to a hospital service.   04/13/2024: NAEON. Complaining of dysuria, UA with reflex to culture sent. Pending rolling walker and bedside commode delivery for discharge. Remains stable for discharge home with home health and outpatient follow up.

## 2024-04-12 NOTE — PLAN OF CARE
[facility] - Neuro Critical Care      HOME HEALTH ORDERS  FACE TO FACE ENCOUNTER    Patient Name: Faviola Coughlin  YOB: 1950    PCP: Viji Peña NP   PCP Address: [address] [place] [facility] / [place]  PCP Phone Number: [phone]  PCP Fax: [phone]    Encounter Date: 4/11/24    Admit to Home Health    Diagnoses:  Active Hospital Problems    Diagnosis  POA    *Aneurysm of internal carotid artery [I67.1]  Yes    Factor V Leiden [D68.51]  Yes    History of DVT (deep vein thrombosis) [Z86.718]  Not Applicable    Essential hypertension [I10]  Yes    History of stroke with current residual effects [I69.30]  Not Applicable      Resolved Hospital Problems   No resolved problems to display.       Follow Up Appointments:  Future Appointments   Date Time Provider Department Center   4/24/2024 10:00 AM Dilma Bustos RN [facility]   5/20/2024  3:00 PM [id number]   5/20/2024  4:00 PM Mary Jo Godoy MD [place] [id number] [facility]       Allergies:  Review of patient's allergies indicates:   Allergen Reactions    Iodinated contrast media Rash       Medications: Review discharge medications with patient and family and provide education.    Current Facility-Administered Medications   Medication Dose Route Frequency Provider Last Rate Last Admin    0.9%  NaCl infusion   Intravenous Continuous Yoana Murrieta PA-C 75 mL/hr at 04/12/24 1502 Rate Verify at 04/12/24 1502    acetaminophen tablet 650 mg  650 mg Oral Q6H PRN Yoana Murrieta PA-C   650 mg at 04/12/24 0717    amLODIPine tablet 10 mg  10 mg Oral Daily Yoana Murrieta PA-C   10 mg at 04/12/24 0843    apixaban tablet 2.5 mg  2.5 mg Oral BID Yoana Murrieta PA-C   2.5 mg at 04/12/24 0843    aspirin chewable tablet 81 mg  81 mg Oral Daily Yoana Murrieta PA-C   81 mg at 04/12/24 0843    clopidogreL tablet 75 mg  75 mg Oral Daily Yoana Murrieta PA-C   75 mg at 04/12/24  0843    hydrALAZINE injection 10 mg  10 mg Intravenous Q4H PRN Yoana Murrieta PA-C   10 mg at 04/11/24 2110    hydrALAZINE tablet 50 mg  50 mg Oral Q8H Yoana Murrieta PA-C   50 mg at 04/12/24 1335    labetalol 20 mg/4 mL (5 mg/mL) IV syring  10 mg Intravenous Q4H PRN Yoana Murrieta PA-C   10 mg at 04/11/24 2010    LIDOcaine 5 % patch 1 patch  1 patch Transdermal Q24H Yoana Murrieta PA-C   1 patch at 04/12/24 0130    losartan tablet 50 mg  50 mg Oral Daily Yoana Murrieta PA-C   50 mg at 04/12/24 0843    magnesium oxide split tablet 800 mg  800 mg Oral PRN Yoana Murrieta PA-C        magnesium oxide split tablet 800 mg  800 mg Oral PRN Yoana Murrieta PA-C        metoprolol tartrate (LOPRESSOR) split tablet 12.5 mg  12.5 mg Oral BID Yoana Murrieta PA-C   12.5 mg at 04/12/24 0843    ondansetron injection 4 mg  4 mg Intravenous Q6H PRN Yoana Murrieta PA-C        potassium bicarbonate disintegrating tablet 35 mEq  35 mEq Oral PRN Yoana Murrieta PA-TERESE        potassium bicarbonate disintegrating tablet 50 mEq  50 mEq Oral PRN NicYoana power PA-C   50 mEq at 04/12/24 0621    potassium bicarbonate disintegrating tablet 60 mEq  60 mEq Oral PRN Yoana Murrieta PA-C        potassium, sodium phosphates 280-160-250 mg packet 2 packet  2 packet Oral PRN NicYoana power PA-TERESE        potassium, sodium phosphates 280-160-250 mg packet 2 packet  2 packet Oral PRN NicYoana power PA-TERESE        potassium, sodium phosphates 280-160-250 mg packet 2 packet  2 packet Oral PRN NicYoana power PA-C        senna-docusate 8.6-50 mg per tablet 1 tablet  1 tablet Oral Daily Yoana Murrieta PA-C   1 tablet at 04/12/24 0843     Current Discharge Medication List        START taking these medications    Details   clopidogreL (PLAVIX) 75 mg tablet Take 1 tablet (75 mg total) by mouth once daily.  Qty: 30 tablet, Refills: 11           CONTINUE these medications which have NOT CHANGED     Details   apixaban (ELIQUIS) 2.5 mg Tab Take by mouth 2 (two) times daily.      aspirin (ECOTRIN) 81 MG EC tablet Take 1 tablet (81 mg total) by mouth once daily. Pt to take otc. To be taken with Plavix  Qty: 30 tablet, Refills: 0    Associated Diagnoses: Right internal carotid artery aneurysm      cetirizine (ZYRTEC) 10 MG tablet Take 10 mg by mouth once daily.      DULoxetine (CYMBALTA) 30 MG capsule Take 30 mg by mouth once daily.      amLODIPine (NORVASC) 2.5 MG tablet Take 2.5 mg by mouth once daily.           STOP taking these medications       diphenhydrAMINE (BENADRYL) 50 MG capsule Comments:   Reason for Stopping:         ticagrelor (BRILINTA) 90 mg tablet Comments:   Reason for Stopping:                 I have seen and examined this patient within the last 30 days. My clinical findings that support the need for the home health skilled services and home bound status are the following:no   Weakness/numbness causing balance and gait disturbance due to Weakness/Debility and Cerebral aneurysm making it taxing to leave home.  Requiring assistive device to leave home due to unsteady gait caused by  Weakness/Debility and cerebral aneurysm.  Patient with medication mismanagement issues requiring home bound status as evidenced by  Poor understanding of medication regimen/dosage and Poor adherence to medication regimen/dosage.  Medical restrictions requiring assistance of another human to leave home due to  Unstable ambulation and Frequent Falls.     Diet:   cardiac diet    Labs:  As needed. Report to PCP    Referrals/ Consults  Physical Therapy to evaluate and treat. Evaluate for home safety and equipment needs; Establish/upgrade home exercise program. Perform / instruct on therapeutic exercises, gait training, transfer training, and Range of Motion.  Occupational Therapy to evaluate and treat. Evaluate home environment for safety and equipment needs. Perform/Instruct on transfers, ADL training, ROM, and  therapeutic exercises.   to evaluate for community resources/long-range planning.  Aide to provide assistance with personal care, ADLs, and vital signs.    Activities:   activity as tolerated    Nursing:   Agency to admit patient within 24 hours of hospital discharge unless specified on physician order or at patient request    SN to complete comprehensive assessment including routine vital signs. Instruct on disease process and s/s of complications to report to MD. Review/verify medication list sent home with the patient at time of discharge  and instruct patient/caregiver as needed. Frequency may be adjusted depending on start of care date.     Skilled nurse to perform up to 3 visits PRN for symptoms related to diagnosis    Notify MD if SBP > 160 or < 90; DBP > 90 or < 50; HR > 120 or < 50; Temp > 101; O2 < 88%; Other:       Ok to schedule additional visits based on staff availability and patient request on consecutive days within the home health episode.    When multiple disciplines ordered:    Start of Care occurs on Sunday - Wednesday schedule remaining discipline evaluations as ordered on separate consecutive days following the start of care.    Thursday SOC -schedule subsequent evaluations Friday and Monday the following week.     Friday - Saturday SOC - schedule subsequent discipline evaluations on consecutive days starting Monday of the following week.    For all post-discharge communication and subsequent orders please contact patient's primary care physician. If unable to reach primary care physician or do not receive response within 30 minutes, please contact PCP for clinical staff order clarification        Home Health Aide:  Nursing Three times weekly, Physical Therapy Three times weekly, Occupational Therapy Three times weekly, Medical Social Work Weekly, and Home Health Aide Three times weekly    Wound Care Orders  no    I certify that this patient is confined to her home and needs  intermittent skilled nursing care, physical therapy, and occupational therapy.

## 2024-04-12 NOTE — ASSESSMENT & PLAN NOTE
74 y/o F presents to [facility] s/p elective pipeline flow diversion assisted coiling and angioplasty within the flow diverter of saccular R supraclinoid segment aneurysm and two R cavernous segment saccular aneurysms of the R ICA.     -Admit to [facility]  -NeuroIR following  -q1h neuro checks, vital checks  -EKG, ECHO, CXR  -Daily CBC, CMP, mag, phos  -SBP < 140, prn labetalol and hydralazine  -Resume DAPT  -SCDs, apixiban for hx of Factor V Leiden  -PT/OT/SLP as appropriate

## 2024-04-12 NOTE — H&P
[facility] - Neuro Critical Care  Neurocritical Care  History & Physical    Admit Date: 4/11/2024  Service Date: 04/11/2024  Length of Stay: 0    Subjective:     Chief Complaint: Aneurysm of internal carotid artery    History of Present Illness: Ms. Faviola Coughlin is a 74 y/o F with a PMHx of HTN, Factor V Leiden c/b DVT, and R ICA aneurysms who presents to [facility] s/p elective Neuro IR to secure R ICA aneurysms. The aneurysms were incidentally found in 2022 following acute LSW prior to a cholecystectomy. MRI/MRA were performed at that time, showing a remote R frontal infarct, an old R insular hemorrhage, as well as incidentally found 4-5 mm R supraclinoid ICA aneurysm without rupture. She was seen by IR, Dr. Hardy in August 2022 who recommended observation rather than treatment due to her elevated stroke risk and low risk of rupture, with MRA every 1-2 years. She recently had MRA done by her PCP, which was concerning for small amount of aneurysmal growth compared to prior scan. She underwent a pipeline flow diversion assisted coiling and angioplasty within the flow diverter of saccular R supraclinoid segment aneurysm and two R cavernous segment saccular aneurysms of the R ICA.    She is admitted to [facility] for hourly neuromonitoring and a higher level of care.         Past Medical History:   Diagnosis Date    Arthritis     Hypertension     Stroke      Past Surgical History:   Procedure Laterality Date    CHOLECYSTECTOMY  2022      No current facility-administered medications on file prior to encounter.     Current Outpatient Medications on File Prior to Encounter   Medication Sig Dispense Refill    amLODIPine (NORVASC) 10 MG tablet Take 1 tablet by mouth every morning.      apixaban (ELIQUIS) 2.5 mg Tab Take by mouth 2 (two) times daily.      aspirin (ECOTRIN) 81 MG EC tablet Take 1 tablet (81 mg total) by mouth once daily. Pt to take otc. To be taken with Plavix 30 tablet 0    baclofen (LIORESAL) 10 MG tablet Take 10 mg by  mouth nightly as needed.      benazepriL (LOTENSIN) 20 MG tablet Take 1 tablet by mouth once daily.      diphenhydrAMINE (BENADRYL) 50 MG capsule Pt to take 50 mg benadryl at 1 hour before procedure 1 capsule 0    hydroCHLOROthiazide (MICROZIDE) 12.5 mg capsule Take 1 capsule by mouth once daily.      melatonin (MELATIN) 5 mg Take 3 mg by mouth nightly as needed.      metoprolol succinate (TOPROL-XL) 25 MG 24 hr tablet Take 1 tablet by mouth once daily.      predniSONE (DELTASONE) 50 MG Tab Pt to take 1 tab (50mg total) 13 hours, 7 hours, 1 hour before procedure in addition take over the counter benadryl 50 mg 1 hr prior to procedure 3 tablet 0    ticagrelor (BRILINTA) 90 mg tablet Take 1 tablet (90 mg total) by mouth 2 (two) times daily. 60 tablet 11    ascorbic acid, vitamin C, (VITAMIN C) 100 MG tablet Take 1 tablet by mouth every morning.      hydrALAZINE (APRESOLINE) 25 MG tablet Take 25 mg by mouth every 8 (eight) hours as needed.      ipratropium (ATROVENT) 42 mcg (0.06 %) nasal spray USE 2 SPRAYS IN EACH NOSTRIL THREE TIMES DAILY FOR RUNNY NOSE      losartan (COZAAR) 50 MG tablet Take 1 tablet by mouth once daily.      ondansetron (ZOFRAN) 4 MG tablet Take 4 mg by mouth 4 (four) times daily as needed.      pantoprazole (PROTONIX) 40 MG tablet Take 40 mg by mouth daily as needed.      vitamin D (VITAMIN D3) 1000 units Tab Take 1 tablet by mouth every morning.        Allergies: Iodinated contrast media  History reviewed. No pertinent family history.  Social History     Tobacco Use    Smoking status: Never    Smokeless tobacco: Never   Substance Use Topics    Alcohol use: Never    Drug use: Never     Review of Systems   HENT:  Positive for sore throat.    Neurological:  Positive for weakness. Negative for dizziness, facial asymmetry, speech difficulty, numbness and headaches.     Objective:     Vitals:    Temp: 97.5 °F (36.4 °C)  Pulse: 91  BP: (!) 150/78  MAP (mmHg): 107  Resp: (!) 21  SpO2: 95 %    Temp  Min:  97.5 °F (36.4 °C)  Max: 97.5 °F (36.4 °C)  Pulse  Min: 82  Max: 91  BP  Min: 150/78  Max: 153/73  MAP (mmHg)  Min: 105  Max: 107  Resp  Min: 18  Max: 21  SpO2  Min: 95 %  Max: 96 %    No intake/output data recorded.            Physical Exam  HENT:      Head: Normocephalic and atraumatic.   Cardiovascular:      Rate and Rhythm: Normal rate and regular rhythm.   Pulmonary:      Effort: Pulmonary effort is normal. No respiratory distress.      Comments: Simple face mask  Neurological:      Mental Status: She is alert.      Comments: [id number]  AAOx3  No aphasia or dysarthria  PERRL  Pupils brisk  FC x 4  SILT              Today I personally reviewed pertinent medications, lines/drains/airways, imaging, cardiology results, laboratory results, microbiology results, notably:        Assessment/Plan:     Neuro  History of stroke with current residual effects  LSW  PT/OT    Cardiac/Vascular  * Aneurysm of internal carotid artery  72 y/o F presents [facility] s/p elective pipeline flow diversion assisted coiling and angioplasty within the flow diverter of saccular R supraclinoid segment aneurysm and two R cavernous segment saccular aneurysms of the R ICA.     -Admit to [facility]  -NeuroIR following  -q1h neuro checks, vital checks  -EKG, ECHO, CXR  -Daily CBC, CMP, mag, phos  -SBP < 140, prn labetalol and hydralazine  -Resume DAPT  -SCDs, apixiban for hx of Factor V Leiden  -PT/OT/SLP as appropriate      Essential hypertension  SBP < 140 in acute post-IR setting  PRN labetalol, hydralazine  Resume home medications as appropriate    Hematology  History of DVT (deep vein thrombosis)  See Factor V Leiden    Factor V Leiden  Resume home eliquis 2.5 bid            The patient is being Prophylaxed for:  Venous Thromboembolism with: Mechanical or Chemical  Stress Ulcer with: None  Ventilator Pneumonia with: none    Activity Orders            Progressive Mobility Protocol (mobilize patient to their highest level of functioning at least twice  daily) starting at 04/11 2000    Diet NPO: NPO starting at 04/11 1222          Full Code    50 minutes of Critical care time was spent personally by me on the following activities: development of treatment plan with patient or surrogate and bedside caregivers, discussions with consultants, evaluation of patient's response to treatment, examination of patient, ordering and performing treatments and interventions, ordering and review of laboratory studies, ordering and review of radiographic studies, pulse oximetry, antibiotic titration if applicable, vasopressor titration if applicable, re-evaluation of patient's condition. This critical care time did not overlap with that of any other provider or involve time for any procedures. There is high probability for acute neurological change leading to clinical and possibly life-threatening deterioration requiring highest level of provider preparedness for urgent intervention.     Yoana Murrieta PA-C  Neurocritical Care  [facility] Neuro [facility]

## 2024-04-12 NOTE — ANESTHESIA POSTPROCEDURE EVALUATION
Anesthesia Post Evaluation    Patient: Faviola Coughlin    Procedure(s) Performed: * No procedures listed *    Final Anesthesia Type: general      Patient location during evaluation: ICU  Patient participation: Yes- Able to Participate  Level of consciousness: awake  Post-procedure vital signs: reviewed and stable  Pain management: adequate  Airway patency: patent    PONV status at discharge: No PONV  Anesthetic complications: no      Cardiovascular status: hemodynamically stable  Respiratory status: unassisted  Follow-up not needed.                  No case tracking events are documented in the log.      Pain/Charlotte Score: No data recorded

## 2024-04-12 NOTE — DISCHARGE INSTRUCTIONS
Continue taking your current medications, with the exception of Brilinta. Stop taking Brilinta, and start taking Plavix (generic name is clopidogrel).    As discussed, if you are feeling weak or lightheaded, please do not push yourself or try to stand unassisted. You are now on 3 blood-thinning medications, which can make falls with head injuries life-threatening. If for some reason you do fall and hit your head, go to the closest emergency room. Immediately let the staff know that you have a history of brain aneurysms and are taking three blood thinners.    Follow-up with Dr. Godoy and your PCP within the next one-two weeks.

## 2024-04-12 NOTE — PT/OT/SLP EVAL
Occupational Therapy   Evaluation and Tx     Name: Faviola Coughlin  MRN: [id number]  Admitting Diagnosis: Aneurysm of internal carotid artery  Recent Surgery: * No procedures listed * 1 Day Post-Op    Recommendations:     Discharge Recommendations: Low Intensity Therapy  Discharge Equipment Recommendations:  rollator, bedside commode   Patient has a mobility limitation that significantly impairs their ability to participate in one or more mobility related activities of daily living, including toileting. This deficit can be resolved by using a bedside commode. Patient demonstrates mobility limitations that will cause them to be confined to one room at home without bathroom access for up to 30 days. Using a bedside commode will greatly improve the patient's ability to participate in MRADLs.     Patient demonstrates a mobility limitation that significantly impairs their ability to participate in one or more mobility related activities of daily living. Patient's mobility limitation cannot be sufficiently resolved with the use of a cane, but can be sufficiently resolved with the use of a rollator.The use of a rollator will considerably improve their ability to participate in MRADLs. Patient will use the walker on a regular basis at home.     Barriers to discharge:  None    Assessment:     Faviola Coughlin is a 73 y.o. female with a medical diagnosis of Aneurysm of internal carotid artery.  She presents with the following performance deficits affecting function: weakness, impaired self care skills, impaired functional mobility, gait instability, decreased lower extremity function, pain.  Pt tolerated session well, which focused on evaluating pt's current functional status 2/2 recent procedure. She was able to perform all mobility fairly well, slight unsteadiness and some weakness noted associated with prolonged bed rest. However, quality and safety of mobility improved with the use of RW. She required CGA for sit to stand  "and Min A with mobility 2/2 requiring cueing for RW management and body mechanics to improve balance. Provided extensive education to daughter (caregiver) and pt on home safety and how to utilize adaptive equipment to increase independence while decreasing fall risk. Both verbalized understanding. Pt would benefit from continued skilled acute OT services in order to maximize (I) and with ADLs and functional mobility to ensure safe return to PLOF in the least restrictive environment. OT recommending low intensity therapy once pt is medically appropriate for d/c.       Rehab Prognosis: Good; patient would benefit from acute skilled OT services to address these deficits and reach maximum level of function.       Plan:     Patient to be seen 3 x/week to address the above listed problems via self-care/home management, therapeutic activities, therapeutic exercises, neuromuscular re-education  Plan of Care Expires: 05/12/24  Plan of Care Reviewed with: patient, daughter    Subjective   "I'm eloina weak from laying here this long"  Chief Complaint: None stated   Patient/Family Comments/goals: return to PLOF and safety     Occupational Profile:  Living Environment: Pt lives with daughter in a [facility] with 0 KARIE. WIS with SC for bathing.   Previous level of function: Utilized a Rollator for mobility, required some assist for bed mobility and ADLs for safety. Reporting current rollator  is not hers personally and has brake issues. Has power wheelchair for community mobility.   Roles and Routines: No driving.   Equipment Used at Home: wheelchair, shower chair, rollator  Assistance upon Discharge: Family (daughter) and reports granddaughter is an RN and assist when not at work.     Pain/Comfort:  Pain Rating 1: 0/10  Pain Rating Post-Intervention 1: 0/10    Patients cultural, spiritual, Quaker conflicts given the current situation: no    Objective:     Communicated with: RN prior to session.  Patient found HOB elevated with blood " "pressure cuff, PureWick, peripheral IV, telemetry, pulse ox (continuous) upon OT entry to room.    General Precautions: Standard, fall  Orthopedic Precautions: N/A  Braces: N/A  Respiratory Status: Room air    Occupational Performance:    Bed Mobility:    Patient completed Scooting/Bridging with contact guard assistance  Patient completed Supine to Sit with moderate assistance  Patient completed Sit to Supine with contact guard assistance    Functional Mobility/Transfers:  Patient completed Sit <> Stand Transfer with contact guard assistance  with  rolling walker   Functional Mobility: Pt participated in room  mobility to simulate home and community distances for 12 ft with Min (A) and RW.   Required verbal cueing for RW management and overall body posture/positioning awareness to improve balance and safety with turning and changing directions.     Activities of Daily Living:  Grooming: supervision to perform facial hygiene while sitting EOB  Toileting: pt with purewick donned; no further needs at this time      Cognitive/Visual Perceptual:  Cognitive/Psychosocial Skills:     -       Oriented to: Person, Place, Time, and Situation   -       Follows Commands/attention:Follows one-step commands  -       Communication: clear/fluent  -       Safety awareness/insight to disability: slightly impaired; requires supervision   Visual/Perceptual:      -reports "floaters". Daughter reporting MD is aware.     Physical Exam:  Balance:    -           Static sitting balance: SPV  - Dynamic sitting balance: SBA  - Static standing balance: CGA with RW  - Dynamic standing balance:  Min A with RW  Postural examination/scapula alignment:    -       Rounded shoulders  Skin integrity: Visible skin intact  Edema:  None noted  Dominant hand:    -       R  Upper Extremity Range of Motion:     -       Right Upper Extremity: WFL  -       Left Upper Extremity: WFL except limited shoulder flex; previous injury  Upper Extremity Strength:    -    "    Right Upper Extremity: Fair   -       Left Upper Extremity: Fair    Strength:    -       Right Upper Extremity: WFL  -       Left Upper Extremity: WFL  Fine Motor Coordination:    -       Intact  Left hand, manipulation of objects and Right hand, manipulation of objects    AMPAC 6 Click ADL:  AMPAC Total Score: 18    Treatment & Education:   Pt and daughter educated on:   Role of OT, POC, and d/c planning.   Safe transfer techniques and proper body mechanics for fall prevention and improved independence with functional transfers    Adaptive equipment   Home safety   Importance of OOB activities to increase endurance and tolerance for increased participation in daily ADLs.   Utilizing the call bell to request for assistance with all functional mobility to ensure safety during hospital stay.     Pt cleared to transfer onto BSC for toileting with staff A   Cleared to transfer into Bedside chair with staff A     Pt and family verbalized understanding and all questions were addressed within the scope of OT.     Patient left up in chair with all lines intact, call button in reach, RN notified, and daughter present    GOALS:   Multidisciplinary Problems       Occupational Therapy Goals          Problem: Occupational Therapy    Goal Priority Disciplines Outcome Interventions   Occupational Therapy Goal     OT, PT/OT Ongoing, Progressing    Description: Goals to be met by: 4/19/2024     Patient will increase functional independence with ADLs by performing:    UE Dressing with Contact Guard Assistance.  Grooming while seated at sink with Supervision.  Toileting from bedside commode with Contact Guard Assistance for hygiene and clothing management.   Supine to sit with Contact Guard Assistance.  Step transfer with Supervision  Toilet transfer to bedside commode with Supervision.                         History:     Past Medical History:   Diagnosis Date    Arthritis     Hypertension     Stroke          Past Surgical  History:   Procedure Laterality Date    CHOLECYSTECTOMY  2022       Time Tracking:     OT Date of Treatment: 04/12/24  OT Start Time: 1342  OT Stop Time: 1414  OT Total Time (min): 32 min    Billable Minutes:Evaluation 15  Neuromuscular Re-education 17    4/12/2024   Co-evaluation/treatment performed due to patient's multiple deficits requiring two skilled therapists to appropriately and safely assess patient's strength, endurance, functional mobility, and ADL performance while facilitating functional tasks in addition to accommodating for patient's activity tolerance and medical acuity.

## 2024-04-12 NOTE — DISCHARGE SUMMARY
[facility]  Neurocritical Care  Discharge Summary    Admit Date: 4/11/2024    Service Date: 04/12/2024    Discharge Date:     Length of Stay: 1    Final Active Diagnoses:    Diagnosis Date Noted POA    PRINCIPAL PROBLEM:  Aneurysm of internal carotid artery [I67.1] 04/11/2024 Yes    Factor V Leiden [D68.51] 04/11/2024 Yes    History of DVT (deep vein thrombosis) [Z86.718] 04/11/2024 Not Applicable    Essential hypertension [I10] 04/11/2024 Yes    History of stroke with current residual effects [I69.30] 04/11/2024 Not Applicable      Problems Resolved During this Admission:      History of Present Illness: Ms. Faviola Coughlin is a 74 y/o F with a PMHx of HTN, Factor V Leiden c/b DVT, and R ICA aneurysms who presents to [facility] s/p elective Neuro IR to secure R ICA aneurysms. The aneurysms were incidentally found in 2022 following acute LSW prior to a cholecystectomy. MRI/MRA were performed at that time, showing a remote R frontal infarct, an old R insular hemorrhage, as well as incidentally found 4-5 mm R supraclinoid ICA aneurysm without rupture. She was seen by IR, Dr. Hardy in August 2022 who recommended observation rather than treatment due to her elevated stroke risk and low risk of rupture, with MRA every 1-2 years. She recently had MRA done by her PCP, which was concerning for small amount of aneurysmal growth compared to prior scan. She underwent a pipeline flow diversion assisted coiling and angioplasty within the flow diverter of saccular R supraclinoid segment aneurysm and two R cavernous segment saccular aneurysms of the R ICA.    She is admitted to [facility] for hourly neuromonitoring and a higher level of care.       Hospital Course by Event: 74 y/o F with a PMHx of HTN, Factor V Leiden c/b DVT, and R ICA aneurysms who presents to [facility] s/p elective Neuro IR to secure R ICA aneurysms.  She underwent a pipeline flow diversion assisted coiling and angioplasty within the flow diverter of saccular R  supraclinoid segment aneurysm and two R cavernous segment saccular aneurysms of the R ICA. Tolerated procedure well without immediate complication, no complications during POD1. Stable for discharge. Instructed to resume current medications, with the exception of Brilinta, which will be replaced by plavix. Pt and family voice understanding. Pt to f/u with Neuro IR and PCP for further management outpatient.    Hospital Course by Problem:   * Aneurysm of internal carotid artery  72 y/o F presents to [facility] s/p elective pipeline flow diversion assisted coiling and angioplasty within the flow diverter of saccular R supraclinoid segment aneurysm and two R cavernous segment saccular aneurysms of the R ICA.     -Admit to [facility]  -NeuroIR following  -q1h neuro checks, vital checks  -EKG, ECHO, CXR  -Daily CBC, CMP, mag, phos  -SBP < 140, prn labetalol and hydralazine  -Resume DAPT  -SCDs, apixiban for hx of Factor V Leiden  -PT/OT/SLP as appropriate      History of stroke with current residual effects  LSW  PT/OT    Essential hypertension  SBP < 140 in acute post-IR setting  PRN labetalol, hydralazine  Resume home medications as appropriate    History of DVT (deep vein thrombosis)  See Factor V Leiden    Factor V Leiden  Resume home eliquis 2.5 bid      Weakness  rollator, bedside commode   Patient has a mobility limitation that significantly impairs their ability to participate in one or more mobility related activities of daily living, including toileting. This deficit can be resolved by using a bedside commode. Patient demonstrates mobility limitations that will cause them to be confined to one room at home without bathroom access for up to 30 days. Using a bedside commode will greatly improve the patient's ability to participate in MRADLs.      Patient demonstrates a mobility limitation that significantly impairs their ability to participate in one or more mobility related activities of daily living. Patient's mobility  limitation cannot be sufficiently resolved with the use of a cane, but can be sufficiently resolved with the use of a rollator.The use of a rollator will considerably improve their ability to participate in MRADLs. Patient will use the walker on a regular basis at home.     Goals of Care Treatment Preferences:  Code Status: Full Code      Significant Results:  Imaging:  Post-op MRA: Normal MRA post coiling of right supraclinoid ICA aneurysm with no significant signal remaining in the coiled aneurysm. Normal morphology and signal characteristics of the remaining vessels of the [id number]-hv-Mlfkev and posterior fossa.      Laboratory:  Recent Labs   Lab 04/12/24  0507   WBC 19.16*   RBC 3.55*   HGB 11.7*   HCT 33.7*      MCV 95   MCH 33.0*   MCHC 34.7     Recent Labs   Lab 04/12/24  0507   CALCIUM 9.0   ALBUMIN 3.3*   PROT 6.5      K 3.5   CO2 23      BUN 15   CREATININE 0.9   ALKPHOS 73   ALT 5*   AST 15   BILITOT 0.9     Physical Exam  Constitutionally: Patient resting comfortably. No acute distress.   HEENT: normocephalic, atraumatic. Extraocular movement intact. Anicteric, no conjunctival injection.  Neck: No tracheal deviation. No gross lymphadenopathy.  CV: regular rate, extremities well perfused.  Pulm: No respiratory distress on room air. Equal chest rise bilaterally.  Abdomen: No appreciable distention or ascites.  MSK: No obvious deformities  Neuro: Awake, alert and oriented to person, place, and time.   Psych: Appropriate mood and affect  Skin: No appreciable rashes or jaundice.       Pending Results: none    Consultations:  None      Procedures:   pipeline flow diversion assisted coiling and angioplasty within the flow diverter of saccular R supraclinoid segment aneurysm and two R cavernous segment saccular aneurysms of the R ICA.    Medications:      Medication List        START taking these medications      clopidogreL 75 mg tablet  Commonly known as: PLAVIX  Take 1 tablet (75 mg total)  by mouth once daily.  Start taking on: April 13, 2024            CONTINUE taking these medications      amLODIPine 2.5 MG tablet  Commonly known as: NORVASC     apixaban 2.5 mg Tab  Commonly known as: ELIQUIS     aspirin 81 MG EC tablet  Commonly known as: ECOTRIN  Take 1 tablet (81 mg total) by mouth once daily. Pt to take otc. To be taken with Plavix     cetirizine 10 MG tablet  Commonly known as: ZYRTEC     DULoxetine 30 MG capsule  Commonly known as: CYMBALTA            STOP taking these medications      diphenhydrAMINE 50 MG capsule  Commonly known as: BENADRYL     ticagrelor 90 mg tablet  Commonly known as: BRILINTA               Where to Get Your Medications        These medications were sent to [facility]  [address] [facility] [address]      Hours: Mon-Fri 7a-7p, Sat-Sun 10a-4p Phone: [phone]   clopidogreL 75 mg tablet       Diet: Heart Healthy    Activity: As tolerated.     Disposition: Discharged to home in stable condition.    Follow Up Plan:  Follow up with Neuro IR and PCP    This discharge took more than 30 minutes to complete.    Kulwant Lindsey MD  Neurocritical Care  [facility] Neuro [facility]

## 2024-04-12 NOTE — PLAN OF CARE
[facility] - Neuro Critical Care  Discharge Final Note    Primary Care Provider: Viji Peña NP    Expected Discharge Date: 4/12/2024    Final Discharge Note (most recent)       Final Note - 04/12/24 1638          Final Note    Assessment Type Final Discharge Note (P)      Anticipated Discharge Disposition Home-Health Care Svc (P)      What phone number can be called within the next 1-3 days to see how you are doing after discharge? [phone] (P)      Hospital Resources/Appts/Education Provided Provided patient/caregiver with written discharge plan information;Appointments scheduled and added to AVS (P)         Post-Acute Status    Post-Acute Authorization Home Health (P)      Home Health Status Referrals Sent (P)      Coverage Medicare (P)      Patient choice form signed by patient/caregiver List from System Post-Acute Care (P)      Discharge Delays Home Medical Equipment (Insurance, Delivery) (P)                      Contact Info       Mary Jo Godoy MD   Specialty: Neurosurgery    [address]  [facility] [address]   Phone: [phone]       Next Steps: Call in 3 day(s)    Viji Peña NP   Specialty: Family Medicine   Relationship: PCP - General    [address]  [facility] [place] [address]   Phone: [phone]       Next Steps: Go on 4/17/2024    Instructions: Please go to your hospital follow up appt with your PCP on 4/17/24 at 10:15 AM, bring your hospital discharge paperwork.          Patient is discharging home with home health, referrals sent, SW will follow up. Pt's hospital follow up appointments are scheduled and in the Pt's AVS.     Pt has a rollator being delivered to her room prior to discharge, RN notified.     Pt has no other post acute needs and is cleared for discharge from a case management standpoint.     5:20 PM  UNABLE TO DISCHARGE DUE TO MEDICAL EQUIPMENT CANNOT BE APPROVED AND DELIVERED TODAY.     SW spoke with the family, they will try to pay  for the BSC out of pocket if insurance cannot pay for it, Pt will have an order for a rollator. Case management will follow up with [facility].    CURT Medrano, RISASW [facility]  [id number]

## 2024-04-12 NOTE — CHAPLAIN
04/11/24 1595   Clinical Encounter Type   Visit Type Initial Visit   Visit Category Critical Care   Visited With Patient and family together   Number of Family Visited 1   Length of Visit 10 Minutes   Patient Spiritual Encounters   Care Provided Compassionate presence;Reflective listening   Patient Coping Accepting;Open/discussion;Family/ friends resources   Family Spiritual Encounters   Care Provided Compassionate presence;Reflective listening   Family Coping Accepting;Open/discussion;Living with uncertainty;Active barrie;Internal strength;Family/ friends resources   Comments - Family Visited w/both pt and her daughter who were awake while I was rounding on the unit. Pt lives in [place] and enjoys the support of several family members back home. Educated them regarding the services offered by the Spiritual Care dept and told them chaplains are available in-house 24/7 to offer support as needed. No needs were expressed during this visit. Chaplains remain available.

## 2024-04-12 NOTE — PLAN OF CARE
OT evaluation completed. OT POC and goals established.     Problem: Occupational Therapy  Goal: Occupational Therapy Goal  Description: Goals to be met by: 4/19/2024     Patient will increase functional independence with ADLs by performing:    UE Dressing with Contact Guard Assistance.  Grooming while seated at sink with Supervision.  Toileting from bedside commode with Contact Guard Assistance for hygiene and clothing management.   Supine to sit with Contact Guard Assistance.  Step transfer with Supervision  Toilet transfer to bedside commode with Supervision.    Outcome: Ongoing, Progressing

## 2024-04-12 NOTE — PLAN OF CARE
"[facility] Care Plan    POC reviewed with Faviola Coughlin and family at 0300. Pt verbalized understanding. Questions and concerns addressed. No acute events overnight. Pt progressing toward goals. Will continue to monitor. See below and flowsheets for full assessment and VS info. Pt is resting well and in good spirits. Has asked about education to ensure a positive outcome. Family is at bedside and pt is resting comfortably.           Is this a stroke patient? no    Neuro:  Hershey Coma Scale  Best Eye Response: 4-->(E4) spontaneous  Best Motor Response: 6-->(M6) obeys commands  Best Verbal Response: 5-->(V5) oriented  Hershey Coma Scale Score: 15  Pupil PERRLA: yes     24hr Temp:  [96.9 °F (36.1 °C)-97.5 °F (36.4 °C)]     CV:   Rhythm: normal sinus rhythm  BP goals:   SBP < 140  MAP > 65    Resp:           Plan: N/A    GI/:     Diet/Nutrition Received: sips of water  Last Bowel Movement:  (pta)  Voiding Characteristics: external catheter    Intake/Output Summary (Last 24 hours) at 4/12/2024 0515  Last data filed at 4/11/2024 1906  Gross per 24 hour   Intake 600 ml   Output --   Net 600 ml          Labs/Accuchecks:  Recent Labs   Lab 04/11/24 2149   WBC 10.08   RBC 3.64*   HGB 12.0   HCT 34.7*         Recent Labs   Lab 04/11/24 2149      K 3.2*   CO2 24      BUN 14   CREATININE 0.9   ALKPHOS 79   ALT 5*   AST 16   BILITOT 1.0    No results for input(s): "PROTIME", "INR", "APTT", "HEPANTIXA" in the last 168 hours.   Recent Labs   Lab 04/11/24 2149   TROPONINI <0.006       Electrolytes: Electrolytes replaced  Accuchecks: none    Gtts:   sodium chloride 0.9%      nicardipine 5 mg/hr (04/11/24 2139)       LDA/Wounds:    Nurses Note -- 4 Eyes    Is there altered skin present? yes   Please check the following boxes that apply:   [] LDA Added if Not in Epic (Describe Wound)   [] New Altered Skin Integrity was Present on Admit and Documented in LDA   [] Wound Image Taken    Wound Care Consulted? " No    Second RN/Staff Member:  Heath    Restraints:        ZAIRA

## 2024-04-12 NOTE — PLAN OF CARE
HOME HEALTH REFERRAL:       Kulwant Lindsey MD   Resident  Neuro Critical Care     Plan of Care      Cosign Needed     Creation Time: 4/12/2024  3:42 PM     Expand All Collapse All    [facility] - Neuro Critical Care        HOME HEALTH ORDERS  FACE TO FACE ENCOUNTER     Patient Name: Faviola Coughlin  YOB: 1950     PCP: Viji Peña NP   PCP Address: [address] [place] [address] / [place]  PCP Phone Number: [phone]  PCP Fax: [phone]     Encounter Date: 4/11/24     Admit to Home Health     Diagnoses:        Active Hospital Problems     Diagnosis   POA    *Aneurysm of internal carotid artery [I67.1]   Yes    Factor V Leiden [D68.51]   Yes    History of DVT (deep vein thrombosis) [Z86.718]   Not Applicable    Essential hypertension [I10]   Yes    History of stroke with current residual effects [I69.30]   Not Applicable       Resolved Hospital Problems   No resolved problems to display.         Follow Up Appointments:         Future Appointments   Date Time Provider Department Center   4/24/2024 10:00 AM Dilma Bustos RN [facility] NEUROS8 [facility]   5/20/2024  3:00 PM [facility]   5/20/2024  4:00 PM Mary Jo Godoy MD [place] [id number] [facility] [address]         Allergies:       Review of patient's allergies indicates:   Allergen Reactions    Iodinated contrast media Rash         Medications: Review discharge medications with patient and family and provide education.     Current Medications             Current Facility-Administered Medications   Medication Dose Route Frequency Provider Last Rate Last Admin    0.9%  NaCl infusion   Intravenous Continuous Yoana Murrieta PA-C 75 mL/hr at 04/12/24 1502 Rate Verify at 04/12/24 1502    acetaminophen tablet 650 mg  650 mg Oral Q6H PRN Yoana Murrieta PA-C   650 mg at 04/12/24 0717    amLODIPine tablet 10 mg  10 mg Oral Daily Yoana Murrieta PA-C   10 mg at 04/12/24 0843    apixaban tablet 2.5 mg  2.5  mg Oral BID Yoana Murrieta PA-C   2.5 mg at 04/12/24 0843    aspirin chewable tablet 81 mg  81 mg Oral Daily Yoana Murrieta PA-C   81 mg at 04/12/24 0843    clopidogreL tablet 75 mg  75 mg Oral Daily Yoana Murrieta PA-C   75 mg at 04/12/24 0843    hydrALAZINE injection 10 mg  10 mg Intravenous Q4H PRN Yoana Murrieta PA-C   10 mg at 04/11/24 2110    hydrALAZINE tablet 50 mg  50 mg Oral Q8H Yoana Murrieta PA-C   50 mg at 04/12/24 1335    labetalol 20 mg/4 mL (5 mg/mL) IV syring  10 mg Intravenous Q4H PRN Yoana Murrieta PA-C   10 mg at 04/11/24 2010    LIDOcaine 5 % patch 1 patch  1 patch Transdermal Q24H Yoana Murrieta PA-C   1 patch at 04/12/24 0130    losartan tablet 50 mg  50 mg Oral Daily Yoana Murrieta PA-C   50 mg at 04/12/24 0843    magnesium oxide split tablet 800 mg  800 mg Oral PRN Yoana Murrieta PA-C        magnesium oxide split tablet 800 mg  800 mg Oral PRN Yoana Murrieta PA-C        metoprolol tartrate (LOPRESSOR) split tablet 12.5 mg  12.5 mg Oral BID Yoana Murrieta PA-C   12.5 mg at 04/12/24 0843    ondansetron injection 4 mg  4 mg Intravenous Q6H PRN Yoana Murrieta PA-C        potassium bicarbonate disintegrating tablet 35 mEq  35 mEq Oral PRN Yoana Murrieta PA-TERESE        potassium bicarbonate disintegrating tablet 50 mEq  50 mEq Oral PRN NicYoana power PA-C   50 mEq at 04/12/24 0621    potassium bicarbonate disintegrating tablet 60 mEq  60 mEq Oral PRN Yoana Murrieta PA-C        potassium, sodium phosphates 280-160-250 mg packet 2 packet  2 packet Oral PRN NicYoana power PA-TERESE        potassium, sodium phosphates 280-160-250 mg packet 2 packet  2 packet Oral PRN NicYoana power PA-C        potassium, sodium phosphates 280-160-250 mg packet 2 packet  2 packet Oral PRN Yoana Murrieta PA-C        senna-docusate 8.6-50 mg per tablet 1 tablet  1 tablet Oral Daily Yoana Murrieta PA-C   1 tablet at 04/12/24 0856          Current Discharge Medication List              START taking these medications     Details   clopidogreL (PLAVIX) 75 mg tablet Take 1 tablet (75 mg total) by mouth once daily.  Qty: 30 tablet, Refills: 11                   CONTINUE these medications which have NOT CHANGED     Details   apixaban (ELIQUIS) 2.5 mg Tab Take by mouth 2 (two) times daily.       aspirin (ECOTRIN) 81 MG EC tablet Take 1 tablet (81 mg total) by mouth once daily. Pt to take otc. To be taken with Plavix  Qty: 30 tablet, Refills: 0     Associated Diagnoses: Right internal carotid artery aneurysm       cetirizine (ZYRTEC) 10 MG tablet Take 10 mg by mouth once daily.       DULoxetine (CYMBALTA) 30 MG capsule Take 30 mg by mouth once daily.       amLODIPine (NORVASC) 2.5 MG tablet Take 2.5 mg by mouth once daily.                   STOP taking these medications         diphenhydrAMINE (BENADRYL) 50 MG capsule Comments:   Reason for Stopping:            ticagrelor (BRILINTA) 90 mg tablet Comments:   Reason for Stopping:                       I have seen and examined this patient within the last 30 days. My clinical findings that support the need for the home health skilled services and home bound status are the following:no              Weakness/numbness causing balance and gait disturbance due to Weakness/Debility and Cerebral aneurysm making it taxing to leave home.  Requiring assistive device to leave home due to unsteady gait caused by  Weakness/Debility and cerebral aneurysm.  Patient with medication mismanagement issues requiring home bound status as evidenced by  Poor understanding of medication regimen/dosage and Poor adherence to medication regimen/dosage.  Medical restrictions requiring assistance of another human to leave home due to  Unstable ambulation and Frequent Falls.      Diet:   cardiac diet     Labs:  As needed. Report to PCP     Referrals/ Consults  Physical Therapy to evaluate and treat. Evaluate for home safety and equipment  needs; Establish/upgrade home exercise program. Perform / instruct on therapeutic exercises, gait training, transfer training, and Range of Motion.  Occupational Therapy to evaluate and treat. Evaluate home environment for safety and equipment needs. Perform/Instruct on transfers, ADL training, ROM, and therapeutic exercises.   to evaluate for community resources/long-range planning.  Aide to provide assistance with personal care, ADLs, and vital signs.     Activities:   activity as tolerated     Nursing:   Agency to admit patient within 24 hours of hospital discharge unless specified on physician order or at patient request     SN to complete comprehensive assessment including routine vital signs. Instruct on disease process and s/s of complications to report to MD. Review/verify medication list sent home with the patient at time of discharge  and instruct patient/caregiver as needed. Frequency may be adjusted depending on start of care date.      Skilled nurse to perform up to 3 visits PRN for symptoms related to diagnosis     Notify MD if SBP > 160 or < 90; DBP > 90 or < 50; HR > 120 or < 50; Temp > 101; O2 < 88%; Other:        Ok to schedule additional visits based on staff availability and patient request on consecutive days within the home health episode.     When multiple disciplines ordered:     Start of Care occurs on Sunday - Wednesday schedule remaining discipline evaluations as ordered on separate consecutive days following the start of care.     Thursday SOC -schedule subsequent evaluations Friday and Monday the following week.      Friday - Saturday SOC - schedule subsequent discipline evaluations on consecutive days starting Monday of the following week.     For all post-discharge communication and subsequent orders please contact patient's primary care physician. If unable to reach primary care physician or do not receive response within 30 minutes, please contact PCP for clinical staff  order clarification           Home Health Aide:  Nursing Three times weekly, Physical Therapy Three times weekly, Occupational Therapy Three times weekly, Medical Social Work Weekly, and Home Health Aide Three times weekly    Wound Care Orders  no     I certify that this patient is confined to her home and needs intermittent skilled nursing care, physical therapy, and occupational therapy.                                [facility] - Neuro Critical Care       Encounter Date: 2024  7:31 PM    IP Admit Date: 24   Discharge Date: No discharge date for patient encounter.   Care Everywhere:  [id number]    MRN: [id number]   Guarantor: LUPE GARSIA   Contact Serial #: [id number]   Hospital Account: [id number]       ENCOUNTER          Patient Class: Inpatient   Unit: [facility]*   Hospital Service: Neuro Critical Care   Bed: 9087 A   Admitting Provider: Renny Balbuena Do   Referring Physician: Mary Jo Godoy   Attending Provider: Renny Balbuena Do   Adm Diagnosis: Right internal carotid a*   PATIENT                 Name: SUNLUPE : 1950 (73 yrs)   Address: [address] [place], [place] [address]  Sex: Female   City: [place] [address] SSN:    Primary Care Provider: Viji Peña NP         Primary Phone: [phone]   EMERGENCY CONTACT   Contact Name Legal Guardian? Relationship to Patient Home Phone Work Phone Mobile Phone   1. Melisa Roe  2. *No Contact Specified*      Daughter              [phone]   GUARANTOR                  Guarantor: LUPE GARSIA       : 1950   Address: [address] [place], [place] [address]     Sex: Female     [place] [address] Guarantor  Type: P/F   Relation to Patient: Self         Home Phone: [phone]   Guarantor ID: [id number]         Work Phone:     GUARANTOR EMPLOYER     Employer:             Status: RETIRED   COVERAGE        PRIMARY INSURANCE   Payor: MEDICARE Plan: MEDICARE PART A & B   Group Number:   Insurance Type: INDEMNITY    Subscriber Name: LUPE COUGHLIN Subscriber : 1950   Subscriber ID: [id number] Insurance Address: [address] [address]   Pat. Rel. to Subscriber: Self       SECONDARY INSURANCE   Payor:   Plan:     Group Number:   Insurance Type:     Subscriber Name:   Subscriber :     Subscriber ID:   Insurance Address:     Pat. Rel. to Subscriber:            H & P  Notes from 24 through 24  H&P by Yoana Murrieta PA-C at 2024  9:39 PM    Author: Yoana Murrieta PA-C Service: Neuro Critical Care Author Type: Physician Assistant   Filed: 2024  9:40 PM Creation Time: 2024  9:39 PM Status: Signed   : Yoana Murrieta PA-C (Physician Assistant) Cosigner: Renny Balbuena DO at 2024 11:56 PM   [facility] - Neuro Critical Care  Neurocritical Care  History & Physical     Admit Date: 2024  Service Date: 2024  Length of Stay: 0     Subjective:      Chief Complaint: Aneurysm of internal carotid artery     History of Present Illness: Ms. Lupe Coughlin is a 74 y/o F with a PMHx of HTN, Factor V Leiden c/b DVT, and R ICA aneurysms who presents to [facility] s/p elective Neuro IR to secure R ICA aneurysms. The aneurysms were incidentally found in  following acute LSW prior to a cholecystectomy. MRI/MRA were performed at that time, showing a remote R frontal infarct, an old R insular hemorrhage, as well as incidentally found 4-5 mm R supraclinoid ICA aneurysm without rupture. She was seen by IR, Dr. Hardy in 2022 who recommended observation rather than treatment due to her elevated stroke risk and low risk of rupture, with MRA every 1-2 years. She recently had MRA done by her PCP, which was concerning for small amount of aneurysmal growth compared to prior scan. She underwent a pipeline flow diversion assisted coiling and angioplasty within the flow diverter of saccular R supraclinoid segment aneurysm and two R cavernous segment saccular aneurysms  of the R ICA.     She is admitted to [facility] for hourly neuromonitoring and a higher level of care.                Past Medical History:   Diagnosis Date    Arthritis      Hypertension      Stroke              Past Surgical History:   Procedure Laterality Date    CHOLECYSTECTOMY   2022      No current facility-administered medications on file prior to encounter.             Current Outpatient Medications on File Prior to Encounter   Medication Sig Dispense Refill    amLODIPine (NORVASC) 10 MG tablet Take 1 tablet by mouth every morning.        apixaban (ELIQUIS) 2.5 mg Tab Take by mouth 2 (two) times daily.        aspirin (ECOTRIN) 81 MG EC tablet Take 1 tablet (81 mg total) by mouth once daily. Pt to take otc. To be taken with Plavix 30 tablet 0    baclofen (LIORESAL) 10 MG tablet Take 10 mg by mouth nightly as needed.        benazepriL (LOTENSIN) 20 MG tablet Take 1 tablet by mouth once daily.        diphenhydrAMINE (BENADRYL) 50 MG capsule Pt to take 50 mg benadryl at 1 hour before procedure 1 capsule 0    hydroCHLOROthiazide (MICROZIDE) 12.5 mg capsule Take 1 capsule by mouth once daily.        melatonin (MELATIN) 5 mg Take 3 mg by mouth nightly as needed.        metoprolol succinate (TOPROL-XL) 25 MG 24 hr tablet Take 1 tablet by mouth once daily.        predniSONE (DELTASONE) 50 MG Tab Pt to take 1 tab (50mg total) 13 hours, 7 hours, 1 hour before procedure in addition take over the counter benadryl 50 mg 1 hr prior to procedure 3 tablet 0    ticagrelor (BRILINTA) 90 mg tablet Take 1 tablet (90 mg total) by mouth 2 (two) times daily. 60 tablet 11    ascorbic acid, vitamin C, (VITAMIN C) 100 MG tablet Take 1 tablet by mouth every morning.        hydrALAZINE (APRESOLINE) 25 MG tablet Take 25 mg by mouth every 8 (eight) hours as needed.        ipratropium (ATROVENT) 42 mcg (0.06 %) nasal spray USE 2 SPRAYS IN EACH NOSTRIL THREE TIMES DAILY FOR RUNNY NOSE        losartan (COZAAR) 50 MG tablet Take 1 tablet by mouth  once daily.        ondansetron (ZOFRAN) 4 MG tablet Take 4 mg by mouth 4 (four) times daily as needed.        pantoprazole (PROTONIX) 40 MG tablet Take 40 mg by mouth daily as needed.        vitamin D (VITAMIN D3) 1000 units Tab Take 1 tablet by mouth every morning.          Allergies: Iodinated contrast media  History reviewed. No pertinent family history.  Social History           Tobacco Use    Smoking status: Never    Smokeless tobacco: Never   Substance Use Topics    Alcohol use: Never    Drug use: Never      Review of Systems   HENT:  Positive for sore throat.    Neurological:  Positive for weakness. Negative for dizziness, facial asymmetry, speech difficulty, numbness and headaches.      Objective:      Vitals:     Temp: 97.5 °F (36.4 °C)  Pulse: 91  BP: (!) 150/78  MAP (mmHg): 107  Resp: (!) 21  SpO2: 95 %     Temp  Min: 97.5 °F (36.4 °C)  Max: 97.5 °F (36.4 °C)  Pulse  Min: 82  Max: 91  BP  Min: 150/78  Max: 153/73  MAP (mmHg)  Min: 105  Max: 107  Resp  Min: 18  Max: 21  SpO2  Min: 95 %  Max: 96 %     No intake/output data recorded.             Physical Exam  HENT:      Head: Normocephalic and atraumatic.   Cardiovascular:      Rate and Rhythm: Normal rate and regular rhythm.   Pulmonary:      Effort: Pulmonary effort is normal. No respiratory distress.      Comments: Simple face mask  Neurological:      Mental Status: She is alert.      Comments: E4V5M6  AAOx3  No aphasia or dysarthria  PERRL  Pupils brisk  FC x 4  SILT                  Today I personally reviewed pertinent medications, lines/drains/airways, imaging, cardiology results, laboratory results, microbiology results, notably:           Assessment/Plan:      Neuro  History of stroke with current residual effects  LSW  PT/OT     Cardiac/Vascular  * Aneurysm of internal carotid artery  74 y/o F presents to [facility] s/p elective pipeline flow diversion assisted coiling and angioplasty within the flow diverter of saccular R supraclinoid segment aneurysm  and two R cavernous segment saccular aneurysms of the R ICA.     -Admit to [facility]  -NeuroIR following  -q1h neuro checks, vital checks  -EKG, ECHO, CXR  -Daily CBC, CMP, mag, phos  -SBP < 140, prn labetalol and hydralazine  -Resume DAPT  -SCDs, apixiban for hx of Factor V Leiden  -PT/OT/SLP as appropriate        Essential hypertension  SBP < 140 in acute post-IR setting  PRN labetalol, hydralazine  Resume home medications as appropriate     Hematology  History of DVT (deep vein thrombosis)  See Factor V Leiden     Factor V Leiden  Resume home eliquis 2.5 bid               The patient is being Prophylaxed for:  Venous Thromboembolism with: Mechanical or Chemical  Stress Ulcer with: None  Ventilator Pneumonia with: none     Activity Orders               Progressive Mobility Protocol (mobilize patient to their highest level of functioning at least twice daily) starting at 04/11 2000     Diet NPO: NPO starting at 04/11 1222             Full Code     50 minutes of Critical care time was spent personally by me on the following activities: development of treatment plan with patient or surrogate and bedside caregivers, discussions with consultants, evaluation of patient's response to treatment, examination of patient, ordering and performing treatments and interventions, ordering and review of laboratory studies, ordering and review of radiographic studies, pulse oximetry, antibiotic titration if applicable, vasopressor titration if applicable, re-evaluation of patient's condition. This critical care time did not overlap with that of any other provider or involve time for any procedures. There is high probability for acute neurological change leading to clinical and possibly life-threatening deterioration requiring highest level of provider preparedness for urgent intervention.      Yoana Murrieta PA-C  Neurocritical Care  [facility] - Neuro Critical Care      Electronically signed by Renny Balbuena DO on 4/11/2024 11:56  PM   H&P by Renato Barrientos MD at 4/11/2024  1:00 PM    Author: Renato Barrientos MD Service: Neuro Interventional Author Type: Physician   Filed: 4/11/2024  1:06 PM Creation Time: 4/11/2024  1:05 PM Status: Cosign Needed   : Renato Barrientos MD (Physician) Cosign Required: Yes   Interventional Neuroradiology Pre-procedure Note     Procedure: Diagnostic cerebral angiogram aneurysm embolization using flow diversion     History of Present Illness:  Faviola Coughlin is a 73 y.o. female who presents for R ICA supraclinoid aneurysm emvolization.        ROS:   Hematological: no known coagulopathies  Respiratory: no shortness of breath  Cardiovascular: no chest pain  Gastrointestinal: no abdominal pain  Genito-Urinary: no dysuria  Musculoskeletal: negative  Neurological: no TIA or stroke symptoms      Scheduled Meds:    LIDOcaine (PF) 10 mg/ml (1%)  1 mL Other Once    LIDOcaine-prilocaine   Topical (Top) Once      Current Meds:   Current Outpatient Medications:     amLODIPine (NORVASC) 10 MG tablet, Take 1 tablet by mouth every morning., Disp: , Rfl:     apixaban (ELIQUIS) 2.5 mg Tab, Take by mouth 2 (two) times daily., Disp: , Rfl:     ascorbic acid, vitamin C, (VITAMIN C) 100 MG tablet, Take 1 tablet by mouth every morning., Disp: , Rfl:     aspirin (ECOTRIN) 81 MG EC tablet, Take 1 tablet (81 mg total) by mouth once daily. Pt to take otc. To be taken with Plavix, Disp: 30 tablet, Rfl: 0    baclofen (LIORESAL) 10 MG tablet, Take 10 mg by mouth nightly as needed., Disp: , Rfl:     benazepriL (LOTENSIN) 20 MG tablet, Take 1 tablet by mouth once daily., Disp: , Rfl:     diphenhydrAMINE (BENADRYL) 50 MG capsule, Pt to take 50 mg benadryl at 1 hour before procedure, Disp: 1 capsule, Rfl: 0    hydrALAZINE (APRESOLINE) 25 MG tablet, Take 25 mg by mouth every 8 (eight) hours as needed., Disp: , Rfl:     hydroCHLOROthiazide (MICROZIDE) 12.5 mg capsule, Take 1 capsule by mouth once daily., Disp: , Rfl:      ipratropium (ATROVENT) 42 mcg (0.06 %) nasal spray, USE 2 SPRAYS IN EACH NOSTRIL THREE TIMES DAILY FOR RUNNY NOSE, Disp: , Rfl:     losartan (COZAAR) 50 MG tablet, Take 1 tablet by mouth once daily., Disp: , Rfl:     melatonin (MELATIN) 5 mg, Take 3 mg by mouth nightly as needed., Disp: , Rfl:     metoprolol succinate (TOPROL-XL) 25 MG 24 hr tablet, Take 1 tablet by mouth once daily., Disp: , Rfl:     ondansetron (ZOFRAN) 4 MG tablet, Take 4 mg by mouth 4 (four) times daily as needed., Disp: , Rfl:     pantoprazole (PROTONIX) 40 MG tablet, Take 40 mg by mouth daily as needed., Disp: , Rfl:     predniSONE (DELTASONE) 50 MG Tab, Pt to take 1 tab (50mg total) 13 hours, 7 hours, 1 hour before procedure in addition take over the counter benadryl 50 mg 1 hr prior to procedure, Disp: 3 tablet, Rfl: 0    ticagrelor (BRILINTA) 90 mg tablet, Take 1 tablet (90 mg total) by mouth 2 (two) times daily., Disp: 60 tablet, Rfl: 11    vitamin D (VITAMIN D3) 1000 units Tab, Take 1 tablet by mouth every morning., Disp: , Rfl:      Current Facility-Administered Medications:     0.9%  NaCl infusion, , Intravenous, Continuous, Yasmin Santos PA-C    LIDOcaine (PF) 10 mg/ml (1%) injection 10 mg, 1 mL, Other, Once, Yasmin Santos PA-C    LIDOcaine-prilocaine cream, , Topical (Top), Once, Yasmin Santos PA-C   Continuous Infusions:    sodium chloride 0.9%        PRN Meds:     Allergies:        Review of patient's allergies indicates:   Allergen Reactions    Iodinated contrast media Rash      Sedation Hx: No adverse events.     Labs:         Objective:  Vitals: not currently breastfeeding.      Physical Exam:  General: well developed, well nourished, no distress.   Head: normocephalic, atraumatic  Neck: No tracheal deviation. No palpable masses. Full ROM.   Neurologic: Alert and oriented. Thought content appropriate.  GCS: E4 V5 M6; Total: 15  Language: No aphasia  Speech: No dysarthria  Cranial nerves: face symmetric, tongue  midline, CN II-XII grossly intact.   Eyes: pupils equal, round, reactive to light with accomodation, EOMI.   Pulmonary: normal respirations, no signs of respiratory distress  Abdomen: soft, non-distended, not tender to palpation  Vascular: Pulses 2+ and symmetric radial and dorsalis pedis. No LE edema.   Skin: Skin is warm, dry and intact.  Sensory: intact to light touch throughout  Motor Strength: Moves all extremities spontaneously with good tone.  Full strength upper and lower extremities. No abnormal movements seen.      ASA: 2  MAL: 2     Plan:  -Plan for cerebral angiogram with aneurysm embolization using flow diversion  -Sedation Plan:General anesthesia  -All diagnostics and imaging reviewed  -Patient NPO since MN  -Risks & benefits of procedure explained in detail; patient consented and all questions answered  -Further reccs to follow procedure              Renato Barrientos MD, MHA  Fellow, NeuroEndovascular Surgery, [facility]  Neurologist, [facility] [place], [place]           Electronically signed by Renato Barrientos MD on 4/11/2024  1:06 PM     D/C Summary    Discharge Summary by Kulwant Lindsey MD at 4/12/2024  3:33 PM    Author: Kulwant Lindsey MD Author Type: Resident Filed: 4/12/2024  3:37 PM   Note Status: Cosign Needed Cosign: Cosign Required Date of Service: 4/12/2024  3:33 PM   Creation Time: 4/12/2024  3:33 PM       : Kulwant iLndsey MD (Resident)               [facility] - Neuro Critical Care  Neurocritical Care  Discharge Summary     Admit Date: 4/11/2024     Service Date: 04/12/2024     Discharge Date:      Length of Stay: 1           Final Active Diagnoses:     Diagnosis Date Noted POA    PRINCIPAL PROBLEM:  Aneurysm of internal carotid artery [I67.1] 04/11/2024 Yes    Factor V Leiden [D68.51] 04/11/2024 Yes    History of DVT (deep vein thrombosis) [Z86.718] 04/11/2024 Not Applicable    Essential hypertension [I10] 04/11/2024 Yes    History of stroke with current  residual effects [I69.30] 04/11/2024 Not Applicable       Problems Resolved During this Admission:      History of Present Illness: Ms. Faviola Coughlin is a 72 y/o F with a PMHx of HTN, Factor V Leiden c/b DVT, and R ICA aneurysms who presents to [facility] s/p elective Neuro IR to secure R ICA aneurysms. The aneurysms were incidentally found in 2022 following acute LSW prior to a cholecystectomy. MRI/MRA were performed at that time, showing a remote R frontal infarct, an old R insular hemorrhage, as well as incidentally found 4-5 mm R supraclinoid ICA aneurysm without rupture. She was seen by IR, Dr. Hardy in August 2022 who recommended observation rather than treatment due to her elevated stroke risk and low risk of rupture, with MRA every 1-2 years. She recently had MRA done by her PCP, which was concerning for small amount of aneurysmal growth compared to prior scan. She underwent a pipeline flow diversion assisted coiling and angioplasty within the flow diverter of saccular R supraclinoid segment aneurysm and two R cavernous segment saccular aneurysms of the R ICA.     She is admitted to [facility] for hourly neuromonitoring and a higher level of care.        Hospital Course by Event: 72 y/o F with a PMHx of HTN, Factor V Leiden c/b DVT, and R ICA aneurysms who presents to [facility] s/p elective Neuro IR to secure R ICA aneurysms.  She underwent a pipeline flow diversion assisted coiling and angioplasty within the flow diverter of saccular R supraclinoid segment aneurysm and two R cavernous segment saccular aneurysms of the R ICA. Tolerated procedure well without immediate complication, no complications during POD1. Stable for discharge. Instructed to resume current medications, with the exception of Brilinta, which will be replaced by plavix. Pt and family voice understanding. Pt to f/u with Neuro IR and PCP for further management outpatient.     Hospital Course by Problem:   * Aneurysm of internal carotid artery  72 y/o F presents  to [facility] s/p elective pipeline flow diversion assisted coiling and angioplasty within the flow diverter of saccular R supraclinoid segment aneurysm and two R cavernous segment saccular aneurysms of the R ICA.     -Admit to [facility]  -NeuroIR following  -q1h neuro checks, vital checks  -EKG, ECHO, CXR  -Daily CBC, CMP, mag, phos  -SBP < 140, prn labetalol and hydralazine  -Resume DAPT  -SCDs, apixiban for hx of Factor V Leiden  -PT/OT/SLP as appropriate        History of stroke with current residual effects  LSW  PT/OT     Essential hypertension  SBP < 140 in acute post-IR setting  PRN labetalol, hydralazine  Resume home medications as appropriate     History of DVT (deep vein thrombosis)  See Factor V Leiden     Factor V Leiden  Resume home eliquis 2.5 bid              Goals of Care Treatment Preferences:  Code Status: Full Code        Significant Results:  Imaging:  Post-op MRA: Normal MRA post coiling of right supraclinoid ICA aneurysm with no significant signal remaining in the coiled aneurysm. Normal morphology and signal characteristics of the remaining vessels of the djuafi-fq-Tjnfoh and posterior fossa.        Laboratory:      Recent Labs   Lab 04/12/24  0507   WBC 19.16*   RBC 3.55*   HGB 11.7*   HCT 33.7*      MCV 95   MCH 33.0*   MCHC 34.7          Recent Labs   Lab 04/12/24  0507   CALCIUM 9.0   ALBUMIN 3.3*   PROT 6.5      K 3.5   CO2 23      BUN 15   CREATININE 0.9   ALKPHOS 73   ALT 5*   AST 15   BILITOT 0.9      Physical Exam  Constitutionally: Patient resting comfortably. No acute distress.   HEENT: normocephalic, atraumatic. Extraocular movement intact. Anicteric, no conjunctival injection.  Neck: No tracheal deviation. No gross lymphadenopathy.  CV: regular rate, extremities well perfused.  Pulm: No respiratory distress on room air. Equal chest rise bilaterally.  Abdomen: No appreciable distention or ascites.  MSK: No obvious deformities  Neuro: Awake, alert and oriented to person,  place, and time.   Psych: Appropriate mood and affect  Skin: No appreciable rashes or jaundice.         Pending Results: none     Consultations:  None        Procedures:   pipeline flow diversion assisted coiling and angioplasty within the flow diverter of saccular R supraclinoid segment aneurysm and two R cavernous segment saccular aneurysms of the R ICA.     Medications:       Medication List          START taking these medications       clopidogreL 75 mg tablet  Commonly known as: PLAVIX  Take 1 tablet (75 mg total) by mouth once daily.  Start taking on: April 13, 2024                CONTINUE taking these medications       amLODIPine 2.5 MG tablet  Commonly known as: NORVASC      apixaban 2.5 mg Tab  Commonly known as: ELIQUIS      aspirin 81 MG EC tablet  Commonly known as: ECOTRIN  Take 1 tablet (81 mg total) by mouth once daily. Pt to take otc. To be taken with Plavix      cetirizine 10 MG tablet  Commonly known as: ZYRTEC      DULoxetine 30 MG capsule  Commonly known as: CYMBALTA                STOP taking these medications       diphenhydrAMINE 50 MG capsule  Commonly known as: BENADRYL      ticagrelor 90 mg tablet  Commonly known as: BRILINTA                    Where to Get Your Medications          These medications were sent to [facility]  [address] [address]        Hours: Mon-Fri 7a-7p, Sat-Sun 10a-4p Phone: [phone]   clopidogreL 75 mg tablet         Diet: Heart Healthy     Activity: As tolerated.      Disposition: Discharged to home in stable condition.     Follow Up Plan:  Follow up with Neuro IR and PCP     This discharge took more than 30 minutes to complete.     Kulwant Lindsey MD  Neurocritical Care  [facility]          Showing 1 note, more notes may be available.      Medications    Continuous    Medication Ordered Dose/Rate, Route, Frequency Last Action   0.9%  NaCl infusion 75 mL/hr, IV, Continuous Verify Only, 75 mL/hr at 04/12  1502   Scheduled    Medication Ordered Dose/Rate, Route, Frequency Last Action   amLODIPine tablet 10 mg 10 mg, Oral, Daily Given, 10 mg at 04/12 0843   apixaban tablet 2.5 mg 2.5 mg, Oral, BID Given, 2.5 mg at 04/12 0843   aspirin chewable tablet 81 mg 81 mg, Oral, Daily Given, 81 mg at 04/12 0843   clopidogreL tablet 75 mg 75 mg, Oral, Daily Given, 75 mg at 04/12 0843   hydrALAZINE tablet 50 mg 50 mg, Oral, Q8H Given, 50 mg at 04/12 1335   LIDOcaine 5 % patch 1 patch 1 patch, TD, Q24H Patch Applied, 1 patch at 04/12 0130   losartan tablet 50 mg 50 mg, Oral, Daily Given, 50 mg at 04/12 0843   metoprolol tartrate (LOPRESSOR) split tablet 12.5 mg 12.5 mg, Oral, BID Given, 12.5 mg at 04/12 0843   senna-docusate 8.6-50 mg per tablet 1 tablet 1 tablet, Oral, Daily Given, 1 tablet at 04/12 0843   PRN    Medication Ordered Dose/Rate, Route, Frequency Last Action   acetaminophen tablet 650 mg 650 mg, Oral, Q6H PRN Given, 650 mg at 04/12 0717   hydrALAZINE injection 10 mg 10 mg, IV, Q4H PRN Given, 10 mg at 04/11 2110   labetalol 20 mg/4 mL (5 mg/mL) IV syring 10 mg, IV, Q4H PRN Given, 10 mg at 04/11 2010   magnesium oxide split tablet 800 mg 800 mg, Oral, PRN Ordered   magnesium oxide split tablet 800 mg 800 mg, Oral, PRN Ordered   ondansetron injection 4 mg 4 mg, IV, Q6H PRN Ordered   potassium bicarbonate disintegrating tablet 35 mEq 35 mEq, Oral, PRN Ordered   potassium bicarbonate disintegrating tablet 50 mEq 50 mEq, Oral, PRN Given, 50 mEq at 04/12 0621   potassium bicarbonate disintegrating tablet 60 mEq 60 mEq, Oral, PRN Ordered   potassium, sodium phosphates 280-160-250 mg packet 2 packet 2 packet, Oral, PRN Ordered   potassium, sodium phosphates 280-160-250 mg packet 2 packet 2 packet, Oral, PRN Ordered   potassium, sodium phosphates 280-160-250 mg packet 2 packet 2 packet, Oral, PRN Ordered     Radiology  (Last 24 hours)  04/12 1308 CARDIAC MONITORING STRIPS  Images   04/12 0113 MRA Brain without contrast  Images    04/11 1855 IR Angiogram Carotid Cerebral Bilateral  Images     Nursing Activity Orders  (From admission, onward)  None     Skin Assessment (last 2 days)    None     Patient Observations  (Last 24 hours)  BP    04/12 1502   140/65   04/12 1500   140/68   04/12 1400  138/65   04/12 1300   145/68   04/12 1200  136/65   04/12 1102  131/62   04/12 1100  131/62   04/12 1000  132/61   04/12 0902   141/98   04/12 0900   141/98   04/12 0852  137/64   04/12 0843  137/64   04/12 0800  137/64   04/12 0702   142/68   04/12 0700   142/68   04/12 0621  134/63   04/12 0600  134/63   04/12 0500  126/61   04/12 0445  126/61   04/12 0430   129/59   04/12 0415  129/60   04/12 0400   121/58   04/12 0345   146/71   04/12 0015  128/65   04/12 0000  134/65   04/11 2345  129/60   04/11 2330  130/62   04/11 2315  136/65   04/11 2300  135/64   04/11 2245   134/59   04/11 2230   141/66   04/11 2215  137/62   04/11 2202   147/66   04/11 2200   147/66   04/11 2145   147/67   04/11 2138   160/95   04/11 2130   170/76   04/11 2115   154/72   04/11 2110   145/69   04/11 2101   145/69   04/11 2100   145/69   04/11 2045   149/71   04/11 2030   145/69   04/11 2015  136/69   04/11 2000   156/71   04/11 1945   150/78   Pulse    04/12 1502  86   04/12 1500  89   04/12 1402  91   04/12 1400  94   04/12 1308  96   04/12 1302  87   04/12 1300  86   04/12 1200  91   04/12 1102  80   04/12 1100  78   04/12 1002  84   04/12 1000  85   04/12 0902  99   04/12 0900  94   04/12 0852  91   04/12 0802  89   04/12 0800  91   04/12 0756  90   04/12 0702  93   04/12 0700  89   04/12 0600  90   04/12 0501  89   04/12 0500  89   04/12 0445  90   04/12 0430  88   04/12 0415  88   04/12 0400  87   04/12 0345  87   04/12 0301  87   04/12 0015  88   04/12 0000  90   04/11 2345  87   04/11 2330  90   04/11 2315  90   04/11 2300  91   04/11 2245  93   04/11 2230  94   04/11 2215  93   04/11 2202  92   04/11 2200  96   04/11 2145  96   04/11 2130  96   04/11 2115  93   04/11  2101  83   04/11 2100  83   04/11 2045  83   04/11 2030  85   04/11 2015  83   04/11 2000  93   04/11 1945  91   Resp    04/12 1502   26   04/12 1500   23   04/12 1402   33   04/12 1400   25   04/12 1302  19   04/12 1300   23   04/12 1200   29   04/12 1102   24   04/12 1100   22   04/12 1002   26   04/12 1000   23   04/12 0902   24   04/12 0900   24   04/12 0852   24   04/12 0802   22   04/12 0800   26   04/12 0756   23   04/12 0702   21   04/12 0700   24   04/12 0600   22   04/12 0501   26   04/12 0500   22   04/12 0445   21   04/12 0430  20   04/12 0415   23   04/12 0400   22   04/12 0345   26   04/12 0301  20   04/12 0015   22   04/12 0000   21   04/11 2345  20   04/11 2330   23   04/11 2315   24   04/11 2300   24   04/11 2245   21   04/11 2230   26   04/11 2215   24   04/11 2202   23   04/11 2200   24   04/11 2145   24   04/11 2130   24   04/11 2115   26   04/11 2101  20   04/11 2100  20   04/11 2045   22   04/11 2030   22   04/11 2015  19   04/11 2000   22   04/11 1945   21   SpO2    04/12 1502  97 %   04/12 1500  97 %   04/12 1402  98 %   04/12 1400  98 %   04/12 1302  98 %   04/12 1300  98 %   04/12 1200  98 %   04/12 1102  97 %   04/12 1100  97 %   04/12 1002  98 %   04/12 1000  99 %   04/12 0902  98 %   04/12 0900  98 %   04/12 0852  99 %   04/12 0802  98 %   04/12 0800  97 %   04/12 0756  98 %   04/12 0702  97 %   04/12 0700  98 %   04/12 0600  95 %   04/12 0501  97 %   04/12 0500  96 %   04/12 0445  96 %   04/12 0430  97 %   04/12 0415  96 %   04/12 0400  97 %   04/12 0345  98 %   04/12 0301  95 %   04/12 0015  96 %   04/12 0000  96 %   04/11 2345  96 %   04/11 2330  97 %   04/11 2315  96 %   04/11 2300  97 %   04/11 2245  97 %   04/11 2230  97 %   04/11 2215  98 %   04/11 2202  98 %   04/11 2200  99 %   04/11 2145  99 %   04/11 2130  99 %   04/11 2115  98 %   04/11 2101  100 %   04/11 2100  100 %   04/11 2045  99 %   04/11 2030  99 %   04/11 2015  98 %   04/11 2000  98 %   04/11 1945  95 %   Temp  src    04/12 1502  Oral   04/12 1102  Oral   04/12 0702  Oral   04/12 0301  Temporal   04/11 1901  Axillary   Temp    04/12 1502  98.4 °F (36.9 °C)   04/12 1102  98 °F (36.7 °C)   04/12 0852  98 °F (36.7 °C)   04/12 0702  98 °F (36.7 °C)   04/12 0501  97 °F (36.1 °C)   04/12 0301  97.4 °F (36.3 °C)   04/11 1901  96.9 °F (36.1 °C)   Notes    04/11 2139 H&P by Yoana Murrieta, PA-C Note

## 2024-04-12 NOTE — PLAN OF CARE
[facility]  Initial Discharge Assessment       Primary Care Provider: Viji Peña NP    Admission Diagnosis: Right internal carotid artery aneurysm [I67.1]    Admission Date: 4/11/2024  Expected Discharge Date: 4/15/2024    Transition of Care Barriers: (P) None    Payor: MEDICARE / Plan: MEDICARE PART A & B / Product Type: Government /     Extended Emergency Contact Information  Primary Emergency Contact: Melisa Roe  Mobile Phone: [phone]  Relation: Daughter    Discharge Plan A: (P) Rehab (Pending PT/OT reccs)  Discharge Plan B: (P) Skilled Nursing Facility (pending PT/OT reccs)      [id number] - [place], [place] - [address]  [address] [place] [address]  Phone: [phone] Fax: [phone]      Initial Assessment (most recent)       Adult Discharge Assessment - 04/12/24 1334          Discharge Assessment    Assessment Type Discharge Planning Assessment (P)      Confirmed/corrected address, phone number and insurance Yes (P)      Confirmed Demographics Correct on Facesheet (P)      Source of Information patient (P)      Reason For Admission Aneurysm of internal carotid artery (P)      People in Home child(salbador), adult (P)      Facility Arrived From: Home (P)      Do you expect to return to your current living situation? Yes (P)      Do you have help at home or someone to help you manage your care at home? Yes (P)      Who are your caregiver(s) and their phone number(s)? Dandre Roe [phone] (P)      Prior to hospitilization cognitive status: Alert/Oriented (P)      Current cognitive status: Alert/Oriented (P)      Walking or Climbing Stairs Difficulty yes (P)      Walking or Climbing Stairs ambulation difficulty, requires equipment (P)      Mobility Management Cane, walker, electric transport wheelchair (P)      Dressing/Bathing Difficulty yes (P)      Dressing/Bathing bathing difficulty, requires equipment (P)      Dressing/Bathing Management  Shower chair (P)      Home Accessibility wheelchair accessible (P)      Home Layout Able to live on 1st floor (P)      Equipment Currently Used at Home walker, rolling;wheelchair;cane, straight;bedside commode;raised toilet;shower chair (P)    wheelchair is an electric transport chair    Readmission within 30 days? No (P)      Patient currently being followed by outpatient case management? No (P)      Do you currently have service(s) that help you manage your care at home? No (P)      Do you take prescription medications? Yes (P)      Do you have prescription coverage? No (P)      Coverage Medicare A & B only (P)      Do you have any problems affording any of your prescribed medications? TBD (P)      Is the patient taking medications as prescribed? yes (P)      Who is going to help you get home at discharge? Daughter Melisa Roe [phone] (P)      How do you get to doctors appointments? family or friend will provide (P)      Are you on dialysis? No (P)      Do you take coumadin? No (P)      Discharge Plan A Rehab (P)    Pending PT/OT reccs    Discharge Plan B Skilled Nursing Facility (P)    pending PT/OT reccs    DME Needed Upon Discharge  none (P)      Discharge Plan discussed with: Patient (P)      Transition of Care Barriers None (P)         Physical Activity    On average, how many days per week do you engage in moderate to strenuous exercise (like a brisk walk)? 0 days (P)      On average, how many minutes do you engage in exercise at this level? 0 min (P)         Financial Resource Strain    How hard is it for you to pay for the very basics like food, housing, medical care, and heating? Not hard at all (P)         Housing Stability    In the last 12 months, was there a time when you were not able to pay the mortgage or rent on time? No (P)      In the last 12 months, how many places have you lived? 1 (P)      In the last 12 months, was there a time when you did not have a steady place to sleep or slept in  a shelter (including now)? No (P)         Transportation Needs    In the past 12 months, has lack of transportation kept you from medical appointments or from getting medications? No (P)      In the past 12 months, has lack of transportation kept you from meetings, work, or from getting things needed for daily living? No (P)         Food Insecurity    Within the past 12 months, you worried that your food would run out before you got the money to buy more. Never true (P)      Within the past 12 months, the food you bought just didn't last and you didn't have money to get more. Never true (P)         Stress    Do you feel stress - tense, restless, nervous, or anxious, or unable to sleep at night because your mind is troubled all the time - these days? Only a little (P)         Social Connections    In a typical week, how many times do you talk on the phone with family, friends, or neighbors? Three times a week (P)      How often do you get together with friends or relatives? Three times a week (P)      How often do you attend Pentecostal or Episcopal services? 1 to 4 times per year (P)      Do you belong to any clubs or organizations such as Pentecostal groups, unions, fraternal or athletic groups, or school groups? No (P)      How often do you attend meetings of the clubs or organizations you belong to? Never (P)      Are you , , , , never , or living with a partner?  (P)         Alcohol Use    Q1: How often do you have a drink containing alcohol? Never (P)      Q2: How many drinks containing alcohol do you have on a typical day when you are drinking? Patient does not drink (P)         OTHER    Name(s) of People in Home Dandre Roe [phone] (P)                    Discharge Plan A and Plan B have been determined by review of patient's clinical status, future medical and therapeutic needs, and coverage/benefits for post-acute care in coordination with multidisciplinary  team members.    Pt lives in a one story home with 0 KARIE. Pt was somewhat dependent with ADL's prior to admission and uses various equipment to help with ambulation (listed above). She is not on dialysis or Coumadin.     Patient's disposition plans A & B will be reviewed post PT/OT evaluations.     Pt has transportation home with family. SW will follow for all discharge planning needs.     CURT Medrano, ROBERT  [facility]  [id number]

## 2024-04-12 NOTE — PLAN OF CARE
04/12/24 1622   Post-Acute Status   Post-Acute Authorization Home Health   Home Health Status Referrals Sent   Coverage Medicare   Patient choice form signed by patient/caregiver List from [facility] Post-Acute Care   Discharge Delays (!) Home Medical Equipment (Insurance, Delivery)   Discharge Plan   Discharge Plan A Home Health   Discharge Plan B Home Health     Met with Pt and her daughter  to review discharge recommendation of home health and they are agreeable to plan    Patient/family provided list of facilities in-network with patient's payor plan. Providers that are owned, operated, or affiliated with [facility] are included on the list.     Notified that referral sent to below listed facilities from in-network list based on proximity to home/family support:   [facility]   2.  [facility]  3.  [facility]    Patient/family instructed to identify preference.    Preferred Facility: (if more than 1, listed in order of descending preference)  [facility]    If an additional preferred facility not listed above is identified, additional referral to be sent. If above facilities unable to accept, will send additional referrals to in-network providers.     CURT Medrano, ROBERT  [facility]  [id number]

## 2024-04-12 NOTE — HPI
Ms. Faviola Coughlin is a 74 y/o F with a PMHx of HTN, Factor V Leiden c/b DVT, and R ICA aneurysms who presents to [facility] s/p elective Neuro IR to secure R ICA aneurysms. The aneurysms were incidentally found in 2022 following acute LSW prior to a cholecystectomy. MRI/MRA were performed at that time, showing a remote R frontal infarct, an old R insular hemorrhage, as well as incidentally found 4-5 mm R supraclinoid ICA aneurysm without rupture. She was seen by IR, Dr. Hardy in August 2022 who recommended observation rather than treatment due to her elevated stroke risk and low risk of rupture, with MRA every 1-2 years. She recently had MRA done by her PCP, which was concerning for small amount of aneurysmal growth compared to prior scan. She underwent a pipeline flow diversion assisted coiling and angioplasty within the flow diverter of saccular R supraclinoid segment aneurysm and two R cavernous segment saccular aneurysms of the R ICA.    She is admitted to [facility] for hourly neuromonitoring and a higher level of care.

## 2024-04-12 NOTE — PLAN OF CARE
"[facility] Care Plan    POC reviewed with Faviola Coughlin and family at 1800. Pt verbalized understanding. Questions and concerns addressed. See below and flowsheets for full assessment and VS info.     - PRN tylenol given x2 for headaches and pain   - patient awaiting discharge tomorrow         Is this a stroke patient? No      Neuro:  Alexx Coma Scale  Best Eye Response: 4-->(E4) spontaneous  Best Motor Response: 6-->(M6) obeys commands  Best Verbal Response: 5-->(V5) oriented  [place] Coma Scale Score: 15  Pupil PERRLA: yes     24 hr Temp:  [96.9 °F (36.1 °C)-98.4 °F (36.9 °C)]     CV:   Rhythm: normal sinus rhythm  BP goals:   SBP < 140  MAP > 65    Resp:           Plan: N/A    GI/:     Diet/Nutrition Received: regular  Last Bowel Movement: 04/10/24  Voiding Characteristics: external catheter    Intake/Output Summary (Last 24 hours) at 4/12/2024 1832  Last data filed at 4/12/2024 1702  Gross per 24 hour   Intake 1870.97 ml   Output 700 ml   Net 1170.97 ml          Labs/Accuchecks:  Recent Labs   Lab 04/12/24  0507   WBC 19.16*   RBC 3.55*   HGB 11.7*   HCT 33.7*         Recent Labs   Lab 04/12/24  0507      K 3.5   CO2 23      BUN 15   CREATININE 0.9   ALKPHOS 73   ALT 5*   AST 15   BILITOT 0.9    No results for input(s): "PROTIME", "INR", "APTT", "HEPANTIXA" in the last 168 hours.   Recent Labs   Lab 04/11/24  2149   TROPONINI <0.006       Electrolytes: No replacement orders  Accuchecks: none    Gtts:   sodium chloride 0.9% 75 mL/hr at 04/12/24 1802       LDA/Wounds:      Nurses Note -- 4 Eyes      Is there altered skin present? yes   Please check the following boxes that apply:   [] LDA Added if Not in Epic (Describe Wound)   [] New Altered Skin Integrity was Present on Admit and Documented in LDA   [] Wound Image Taken    Wound Care Consulted? No    Second RN/Staff Member:      "

## 2024-04-13 VITALS
SYSTOLIC BLOOD PRESSURE: 123 MMHG | HEIGHT: 65 IN | RESPIRATION RATE: 20 BRPM | OXYGEN SATURATION: 95 % | TEMPERATURE: 98 F | WEIGHT: 175 LBS | BODY MASS INDEX: 29.16 KG/M2 | HEART RATE: 82 BPM | DIASTOLIC BLOOD PRESSURE: 61 MMHG

## 2024-04-13 LAB
ALBUMIN SERPL BCP-MCNC: 3.3 G/DL (ref 3.5–5.2)
ALP SERPL-CCNC: 75 U/L (ref 55–135)
ALT SERPL W/O P-5'-P-CCNC: 5 U/L (ref 10–44)
ANION GAP SERPL CALC-SCNC: 8 MMOL/L (ref 8–16)
AST SERPL-CCNC: 18 U/L (ref 10–40)
BASOPHILS # BLD AUTO: 0.06 K/UL (ref 0–0.2)
BASOPHILS NFR BLD: 0.4 % (ref 0–1.9)
BILIRUB SERPL-MCNC: 1 MG/DL (ref 0.1–1)
BILIRUB UR QL STRIP: NEGATIVE
BUN SERPL-MCNC: 11 MG/DL (ref 8–23)
CALCIUM SERPL-MCNC: 9 MG/DL (ref 8.7–10.5)
CHLORIDE SERPL-SCNC: 112 MMOL/L (ref 95–110)
CLARITY UR REFRACT.AUTO: CLEAR
CO2 SERPL-SCNC: 23 MMOL/L (ref 23–29)
COLOR UR AUTO: COLORLESS
CREAT SERPL-MCNC: 0.9 MG/DL (ref 0.5–1.4)
DIFFERENTIAL METHOD BLD: ABNORMAL
EOSINOPHIL # BLD AUTO: 0.1 K/UL (ref 0–0.5)
EOSINOPHIL NFR BLD: 1 % (ref 0–8)
ERYTHROCYTE [DISTWIDTH] IN BLOOD BY AUTOMATED COUNT: 13.6 % (ref 11.5–14.5)
EST. GFR  (NO RACE VARIABLE): >60 ML/MIN/1.73 M^2
GLUCOSE SERPL-MCNC: 89 MG/DL (ref 70–110)
GLUCOSE UR QL STRIP: NEGATIVE
HCT VFR BLD AUTO: 35.7 % (ref 37–48.5)
HGB BLD-MCNC: 12 G/DL (ref 12–16)
HGB UR QL STRIP: NEGATIVE
IMM GRANULOCYTES # BLD AUTO: 0.09 K/UL (ref 0–0.04)
IMM GRANULOCYTES NFR BLD AUTO: 0.7 % (ref 0–0.5)
KETONES UR QL STRIP: NEGATIVE
LEUKOCYTE ESTERASE UR QL STRIP: NEGATIVE
LYMPHOCYTES # BLD AUTO: 3.8 K/UL (ref 1–4.8)
LYMPHOCYTES NFR BLD: 28.2 % (ref 18–48)
MAGNESIUM SERPL-MCNC: 1.9 MG/DL (ref 1.6–2.6)
MCH RBC QN AUTO: 33.1 PG (ref 27–31)
MCHC RBC AUTO-ENTMCNC: 33.6 G/DL (ref 32–36)
MCV RBC AUTO: 99 FL (ref 82–98)
MONOCYTES # BLD AUTO: 1 K/UL (ref 0.3–1)
MONOCYTES NFR BLD: 7 % (ref 4–15)
NEUTROPHILS # BLD AUTO: 8.6 K/UL (ref 1.8–7.7)
NEUTROPHILS NFR BLD: 62.7 % (ref 38–73)
NITRITE UR QL STRIP: NEGATIVE
NRBC BLD-RTO: 0 /100 WBC
PH UR STRIP: 7 [PH] (ref 5–8)
PHOSPHATE SERPL-MCNC: 2.6 MG/DL (ref 2.7–4.5)
PLATELET # BLD AUTO: 263 K/UL (ref 150–450)
PMV BLD AUTO: 10.8 FL (ref 9.2–12.9)
POTASSIUM SERPL-SCNC: 3.6 MMOL/L (ref 3.5–5.1)
PROT SERPL-MCNC: 6.2 G/DL (ref 6–8.4)
PROT UR QL STRIP: NEGATIVE
RBC # BLD AUTO: 3.62 M/UL (ref 4–5.4)
SODIUM SERPL-SCNC: 143 MMOL/L (ref 136–145)
SP GR UR STRIP: 1.01 (ref 1–1.03)
URN SPEC COLLECT METH UR: ABNORMAL
WBC # BLD AUTO: 13.64 K/UL (ref 3.9–12.7)

## 2024-04-13 PROCEDURE — 63600175 PHARM REV CODE 636 W HCPCS: Mod: JZ,JG

## 2024-04-13 PROCEDURE — 83735 ASSAY OF MAGNESIUM: CPT

## 2024-04-13 PROCEDURE — 84100 ASSAY OF PHOSPHORUS: CPT

## 2024-04-13 PROCEDURE — 80053 COMPREHEN METABOLIC PANEL: CPT

## 2024-04-13 PROCEDURE — 81003 URINALYSIS AUTO W/O SCOPE: CPT

## 2024-04-13 PROCEDURE — 85025 COMPLETE CBC W/AUTO DIFF WBC: CPT

## 2024-04-13 PROCEDURE — 25000003 PHARM REV CODE 250

## 2024-04-13 RX ORDER — OXYCODONE HYDROCHLORIDE 5 MG/1
2.5 TABLET ORAL EVERY 6 HOURS PRN
Qty: 14 TABLET | Refills: 0 | Status: SHIPPED | OUTPATIENT
Start: 2024-04-13 | End: 2024-04-20

## 2024-04-13 RX ORDER — OXYCODONE HYDROCHLORIDE 5 MG/1
2.5 TABLET ORAL EVERY 6 HOURS PRN
Qty: 14 TABLET | Refills: 0 | Status: SHIPPED | OUTPATIENT
Start: 2024-04-13 | End: 2024-04-13

## 2024-04-13 RX ADMIN — METOPROLOL TARTRATE 12.5 MG: 25 TABLET, FILM COATED ORAL at 08:04

## 2024-04-13 RX ADMIN — LABETALOL HYDROCHLORIDE 10 MG: 5 INJECTION, SOLUTION INTRAVENOUS at 06:04

## 2024-04-13 RX ADMIN — CLOPIDOGREL BISULFATE 75 MG: 75 TABLET ORAL at 08:04

## 2024-04-13 RX ADMIN — ASPIRIN 81 MG CHEWABLE TABLET 81 MG: 81 TABLET CHEWABLE at 08:04

## 2024-04-13 RX ADMIN — HYDRALAZINE HYDROCHLORIDE 50 MG: 50 TABLET ORAL at 01:04

## 2024-04-13 RX ADMIN — LIDOCAINE 5% 1 PATCH: 700 PATCH TOPICAL at 01:04

## 2024-04-13 RX ADMIN — HYDRALAZINE HYDROCHLORIDE 50 MG: 50 TABLET ORAL at 05:04

## 2024-04-13 RX ADMIN — APIXABAN 2.5 MG: 2.5 TABLET, FILM COATED ORAL at 08:04

## 2024-04-13 RX ADMIN — LOSARTAN POTASSIUM 50 MG: 50 TABLET, FILM COATED ORAL at 08:04

## 2024-04-13 RX ADMIN — DICLOFENAC SODIUM 2 G: 10 GEL TOPICAL at 03:04

## 2024-04-13 RX ADMIN — AMLODIPINE BESYLATE 10 MG: 10 TABLET ORAL at 08:04

## 2024-04-13 RX ADMIN — DOCUSATE SODIUM AND SENNOSIDES 1 TABLET: 8.6; 5 TABLET, FILM COATED ORAL at 08:04

## 2024-04-13 RX ADMIN — ACETAMINOPHEN 650 MG: 325 TABLET ORAL at 01:04

## 2024-04-13 RX ADMIN — DICLOFENAC SODIUM 2 G: 10 GEL TOPICAL at 08:04

## 2024-04-13 NOTE — ASSESSMENT & PLAN NOTE
72 y/o F presents to [facility] s/p elective pipeline flow diversion assisted coiling and angioplasty within the flow diverter of saccular R supraclinoid segment aneurysm and two R cavernous segment saccular aneurysms of the R ICA.     -Admitted to [facility]  -NeuroIR following  -q1h neuro checks, vital checks  -Daily CBC, CMP, mag, phos while in hospital  -SBP < 140, prn labetalol and hydralazine  -Resume DAPT  -SCDs, apixiban for hx of Factor V Leiden  -PT/OT/SLP as appropriate  -Recommending low-intensity with home health  -Pending delivery of bedside commode and rolling walker, which is needed for lower extremity weakness  -Remains stable for discharge home with home health and outpatient follow up

## 2024-04-13 NOTE — PLAN OF CARE
"[facility] Care Plan    POC reviewed with Faviola Coughlin and family at 0300. Pt verbalized understanding. Questions and concerns addressed. No acute events overnight. Pt progressing toward goals. Will continue to monitor. See below and flowsheets for full assessment and VS info.     -Labetalol PRN 1x 0600 for SBP > 140.   -Baclofen PRN 1x for pain management.   -Pt refused Tylenol PRN and Lidocaine patch overnight.   -Urine output 1500 mL overnight.   -NAEON.    -Awaiting discharge orders and consult with .        Is this a stroke patient? no    Neuro:  [place] Coma Scale  Best Eye Response: 4-->(E4) spontaneous  Best Motor Response: 6-->(M6) obeys commands  Best Verbal Response: 5-->(V5) oriented  [place] Coma Scale Score: 15  Pupil PERRLA: yes     24hr Temp:  [98 °F (36.7 °C)-98.4 °F (36.9 °C)]     CV:   Rhythm: normal sinus rhythm  BP goals:   SBP < 140  MAP > 65    Resp:           Plan: N/A    GI/:     Diet/Nutrition Received: regular  Last Bowel Movement: 04/10/24  Voiding Characteristics: external catheter    Intake/Output Summary (Last 24 hours) at 4/13/2024 0659  Last data filed at 4/13/2024 0601  Gross per 24 hour   Intake 1543.74 ml   Output 2200 ml   Net -656.26 ml          Labs/Accuchecks:  Recent Labs   Lab 04/13/24  0321   WBC 13.64*   RBC 3.62*   HGB 12.0   HCT 35.7*         Recent Labs   Lab 04/13/24  0321      K 3.6   CO2 23   *   BUN 11   CREATININE 0.9   ALKPHOS 75   ALT 5*   AST 18   BILITOT 1.0    No results for input(s): "PROTIME", "INR", "APTT", "HEPANTIXA" in the last 168 hours.   Recent Labs   Lab 04/11/24  2149   TROPONINI <0.006       Electrolytes: Electrolytes replaced  Accuchecks: none    Gtts:  Current Facility-Administered Medications   Medication Dose Route Frequency Provider Last Rate Last Admin    0.9%  NaCl infusion   Intravenous Continuous Yoana Murrieta PA-C 75 mL/hr at 04/13/24 0601 Rate Verify at 04/13/24 0601    acetaminophen tablet 650 mg  " 650 mg Oral Q6H PRN Yoana Murrieta PA-C   650 mg at 04/12/24 0717    amLODIPine tablet 10 mg  10 mg Oral Daily Yoana Murrieta PA-C   10 mg at 04/12/24 0843    apixaban tablet 2.5 mg  2.5 mg Oral BID Yoana Murrieta PA-C   2.5 mg at 04/12/24 2017    aspirin chewable tablet 81 mg  81 mg Oral Daily Yoana Murrieta PA-C   81 mg at 04/12/24 0843    baclofen tablet 5 mg  5 mg Oral BID PRN Kulwant Lindsey MD   5 mg at 04/12/24 2215    clopidogreL tablet 75 mg  75 mg Oral Daily Yoana Murrieta PA-C   75 mg at 04/12/24 0843    diclofenac sodium 1 % gel 2 g  2 g Topical (Top) TID Kulwant Lindsey MD   2 g at 04/12/24 2018    hydrALAZINE injection 10 mg  10 mg Intravenous Q4H PRN Yoana Murrieta PA-C   10 mg at 04/11/24 2110    hydrALAZINE tablet 50 mg  50 mg Oral Q8H Yoana Murrieta PA-C   50 mg at 04/13/24 0504    labetalol 20 mg/4 mL (5 mg/mL) IV syring  10 mg Intravenous Q4H PRN Yoana Murrieta PA-C   10 mg at 04/13/24 0604    LIDOcaine 5 % patch 1 patch  1 patch Transdermal Q24H Yoana Murrieta PA-C   1 patch at 04/12/24 0130    losartan tablet 50 mg  50 mg Oral Daily Yoana Murrieta PA-C   50 mg at 04/12/24 0843    magnesium oxide split tablet 800 mg  800 mg Oral PRN Yoana Murrieta PA-C        magnesium oxide split tablet 800 mg  800 mg Oral PRN Yoana Murrieta PA-C        metoprolol tartrate (LOPRESSOR) split tablet 12.5 mg  12.5 mg Oral BID Yoana Murrieta PA-C   12.5 mg at 04/12/24 2017    ondansetron injection 4 mg  4 mg Intravenous Q6H PRN Yoana Murrieta PA-C        potassium bicarbonate disintegrating tablet 35 mEq  35 mEq Oral PRN Yoana Murrieta PA-C        potassium bicarbonate disintegrating tablet 50 mEq  50 mEq Oral PRN Yoana Murrieta PA-C   50 mEq at 04/12/24 0621    potassium bicarbonate disintegrating tablet 60 mEq  60 mEq Oral PRN Yoana Murrieta PA-C        potassium, sodium phosphates 280-160-250 mg packet 2 packet  2 packet Oral  PRN Yoana Murrieta PA-C        potassium, sodium phosphates 280-160-250 mg packet 2 packet  2 packet Oral PRN Yoana Murrieta PA-C        potassium, sodium phosphates 280-160-250 mg packet 2 packet  2 packet Oral PRN Yoana Murrieta PA-C        senna-docusate 8.6-50 mg per tablet 1 tablet  1 tablet Oral Daily Yoana Murrieta PA-C   1 tablet at 04/12/24 0843       LDA/Wounds:    Nurses Note -- 4 Eyes    Is there altered skin present? no       Prevention Measures Documented    Second RN/Staff Member:  RAN Malagon    Restraints:        [facility]

## 2024-04-13 NOTE — PROGRESS NOTES
[facility] - Neuro Critical Care  Neurocritical Care  Progress Note    Admit Date: 4/11/2024  Service Date: 04/12/2024  Length of Stay: 1    Subjective:     Chief Complaint: Aneurysm of internal carotid artery    History of Present Illness: Ms. Faviola Coughlin is a 74 y/o F with a PMHx of HTN, Factor V Leiden c/b DVT, and R ICA aneurysms who presents to [facility] s/p elective Neuro IR to secure R ICA aneurysms. The aneurysms were incidentally found in 2022 following acute LSW prior to a cholecystectomy. MRI/MRA were performed at that time, showing a remote R frontal infarct, an old R insular hemorrhage, as well as incidentally found 4-5 mm R supraclinoid ICA aneurysm without rupture. She was seen by IR, Dr. Hardy in August 2022 who recommended observation rather than treatment due to her elevated stroke risk and low risk of rupture, with MRA every 1-2 years. She recently had MRA done by her PCP, which was concerning for small amount of aneurysmal growth compared to prior scan. She underwent a pipeline flow diversion assisted coiling and angioplasty within the flow diverter of saccular R supraclinoid segment aneurysm and two R cavernous segment saccular aneurysms of the R ICA.    She is admitted to [facility] for hourly neuromonitoring and a higher level of care.       Hospital Course: 74 y/o F with a PMHx of HTN, Factor V Leiden c/b DVT, and R ICA aneurysms who presents to [facility] s/p elective Neuro IR to secure R ICA aneurysms.  She underwent a pipeline flow diversion assisted coiling and angioplasty within the flow diverter of saccular R supraclinoid segment aneurysm and two R cavernous segment saccular aneurysms of the R ICA. Tolerated procedure well without immediate complication, no complications during POD1. Stable for discharge but DME not able to be delivered  to house, and this was not discovered until too late in shift to step down to a hospital service.       Interval History:  NAEON, AF, HDS.    Tolerated procedure well  without immediate complication, no complications during POD1.     Plans for discharge tomorrow AM.    Review of Systems   HENT:  Positive for sore throat.    Neurological:  Positive for weakness. Negative for dizziness, facial asymmetry, speech difficulty, numbness and headaches.       Objective:     Vitals:  Temp: 98.4 °F (36.9 °C)  Pulse: 86  Rhythm: normal sinus rhythm  BP: (!) 140/65  MAP (mmHg): 93  Resp: (!) 26  SpO2: 97 %    Temp  Min: 96.9 °F (36.1 °C)  Max: 98.4 °F (36.9 °C)  Pulse  Min: 78  Max: 99  BP  Min: 121/58  Max: 170/76  MAP (mmHg)  Min: 83  Max: 114  Resp  Min: 19  Max: 33  SpO2  Min: 95 %  Max: 100 %    04/11 0701 - 04/12 0700  In: 1077.2 [I.V.:477.2]  Out: 700 [Urine:700]            Physical Exam  HENT:      Head: Normocephalic and atraumatic.   Cardiovascular:      Rate and Rhythm: Normal rate and regular rhythm.   Pulmonary:      Effort: Pulmonary effort is normal. No respiratory distress.   Neurological:      Mental Status: She is alert.      Comments: E4V5M6  AAOx3  No aphasia or dysarthria  PERRL  Pupils brisk  FC x 4  SILT              Medications:  Continuoussodium chloride 0.9%, Last Rate: 75 mL/hr at 04/12/24 1502    ScheduledamLODIPine, 10 mg, Daily  apixaban, 2.5 mg, BID  aspirin, 81 mg, Daily  clopidogreL, 75 mg, Daily  diclofenac sodium, 2 g, TID  hydrALAZINE, 50 mg, Q8H  LIDOcaine, 1 patch, Q24H  losartan, 50 mg, Daily  metoprolol tartrate, 12.5 mg, BID  senna-docusate 8.6-50 mg, 1 tablet, Daily    PRNacetaminophen, 650 mg, Q6H PRN  hydrALAZINE, 10 mg, Q4H PRN  labetalol, 10 mg, Q4H PRN  magnesium oxide, 800 mg, PRN  magnesium oxide, 800 mg, PRN  ondansetron, 4 mg, Q6H PRN  potassium bicarbonate, 35 mEq, PRN  potassium bicarbonate, 50 mEq, PRN  potassium bicarbonate, 60 mEq, PRN  potassium, sodium phosphates, 2 packet, PRN  potassium, sodium phosphates, 2 packet, PRN  potassium, sodium phosphates, 2 packet, PRN      Today I personally reviewed pertinent medications,  lines/drains/airways, imaging, cardiology results, laboratory results, microbiology results, notably:    Diet  Diet Adult Regular (IDDSI Level 7)  Diet Adult Regular (IDDSI Level 7)        Assessment/Plan:     Neuro  History of stroke with current residual effects  LSW  PT/OT    Cardiac/Vascular  * Aneurysm of internal carotid artery  72 y/o F presents to [facility] s/p elective pipeline flow diversion assisted coiling and angioplasty within the flow diverter of saccular R supraclinoid segment aneurysm and two R cavernous segment saccular aneurysms of the R ICA.     -Admitted to [facility]  -NeuroIR following  -q1h neuro checks, vital checks  -Daily CBC, CMP, mag, phos while in hospital  -SBP < 140, prn labetalol and hydralazine  -Resume DAPT  -SCDs, apixiban for hx of Factor V Leiden  -PT/OT/SLP as appropriate   - recommending low-intensity with home health      Essential hypertension  SBP < 140 in acute post-IR setting  PRN labetalol, hydralazine  Resume home medications as appropriate    Hematology  History of DVT (deep vein thrombosis)  See Factor V Leiden    Factor V Leiden  Resume home eliquis 2.5 bid            The patient is being Prophylaxed for:  Venous Thromboembolism with: Mechanical or Chemical  Stress Ulcer with: None  Ventilator Pneumonia with: not applicable    Activity Orders            Diet Adult Regular (IDDSI Level 7): Regular starting at 04/12 0022    Progressive Mobility Protocol (mobilize patient to their highest level of functioning at least twice daily) starting at 04/11 2000          Full Code    Dispo: ICU til am for discharge    Billing: Level 3        MD Renny Costello, DO  Neurocritical Care  [facility] - Neuro Critical Care

## 2024-04-13 NOTE — PLAN OF CARE
Discharge orders per physician. AVS printed and reviewed at bedside with patient. All follow up appointments and medications reviewed at this time. All patient teaching demonstrated and patient verbalized understanding with teachback method. All care plans and education resolved per adequate discharge. All peripheral IVs/LDAs removed at this time. Transport requested with wheelchair to bedside. Patient successfully discharged.

## 2024-04-13 NOTE — PLAN OF CARE
[facility] - Neuro Critical Care  Discharge Final Note    Primary Care Provider: Viji Peña NP    Expected Discharge Date: 4/12/2024    Final Discharge Note (most recent)       Final Note - 04/13/24 1348          Final Note    Assessment Type Final Discharge Note     Anticipated Discharge Disposition Home-Health Care [facility]     What phone number can be called within the next 1-3 days to see how you are doing after discharge? [phone]     Hospital Resources/Appts/Education Provided Provided patient/caregiver with written discharge plan information        Post-Acute Status    Post-Acute Authorization Home Health;HME     HME Status Set-up Complete/Auth obtained     Home Health Status Set-up Complete/Auth obtained     Coverage Payor: MEDICARE - MEDICARE PART A & B -     Patient choice form signed by patient/caregiver List with quality metrics by geographic area provided     Discharge Delays None known at this time                     Important Message from Medicare             Contact Info       Mary Jo Godoy MD   Specialty: Neurosurgery    [address]  [place] [facility] [address]   Phone: [phone]       Next Steps: Call in 3 day(s)    Viji Peña NP   Specialty: Family Medicine   Relationship: PCP - General    [address]  [place] [facility] [address]   Phone: [phone]       Next Steps: Go on 4/17/2024    Instructions: Please go to your hospital follow up appt with your PCP on 4/17/24 at 10:15 AM, bring your hospital discharge paperwork.          Patient to discharge to home today with [facility]. RW and BSC delivered to bed side.     RENETTA Garay  [facility]  [phone]

## 2024-04-13 NOTE — SUBJECTIVE & OBJECTIVE
Interval History: See hospital course above    Review of Systems   Constitutional:  Negative for chills, fatigue and fever.   HENT:  Positive for sore throat. Negative for trouble swallowing.    Eyes:  Negative for visual disturbance.   Respiratory:  Negative for shortness of breath.    Cardiovascular:  Negative for chest pain.   Gastrointestinal:  Negative for nausea and vomiting.   Genitourinary:  Positive for dysuria.   Musculoskeletal:  Negative for back pain and neck pain.   Neurological:  Positive for weakness. Negative for dizziness, facial asymmetry, speech difficulty, numbness and headaches.   Psychiatric/Behavioral:  Negative for agitation and confusion.       Objective:     Vitals:  Temp: 98 °F (36.7 °C)  Pulse: 82  Rhythm: normal sinus rhythm  BP: 123/61  MAP (mmHg): 85  Resp: 20  SpO2: 95 %    Temp  Min: 97.5 °F (36.4 °C)  Max: 98.2 °F (36.8 °C)  Pulse  Min: 69  Max: 96  BP  Min: 120/57  Max: 161/82  MAP (mmHg)  Min: 81  Max: 112  Resp  Min: 5  Max: 26  SpO2  Min: 93 %  Max: 100 %    04/12 0701 - 04/13 0700  In: 1543.7 [I.V.:1543.7]  Out: 2200 [Urine:2200]            Physical Exam  Vitals and nursing note reviewed.   Constitutional:       General: She is not in acute distress.     Appearance: Normal appearance.      Comments: Elderly female. Well developed. Well nourished. Resting comfortably in bed.   HENT:      Head: Normocephalic and atraumatic.      Right Ear: External ear normal.      Left Ear: External ear normal.      Nose: Nose normal.      Mouth/Throat:      Mouth: Mucous membranes are moist.      Pharynx: Oropharynx is clear.   Eyes:      General: No scleral icterus.     Extraocular Movements: Extraocular movements intact.      Pupils: Pupils are equal, round, and reactive to light.   Cardiovascular:      Rate and Rhythm: Normal rate and regular rhythm.   Pulmonary:      Effort: Pulmonary effort is normal. No respiratory distress.   Abdominal:      General: There is no distension.    Musculoskeletal:      Right lower leg: No edema.      Left lower leg: No edema.   Skin:     General: Skin is warm and dry.   Neurological:      Mental Status: She is alert.      Comments: E4V5M6  AA&Ox4. Speech fluent. Answers questions appropriately. Follows commands briskly.  PERRL. EOMI. CN II-XII grossly intact.  HIMANSHU & AG. SILT in all 4 extremities. No pronator drift.        Gait & coordination exams deferred.        Medications:  Continuoussodium chloride 0.9%, Last Rate: 75 mL/hr at 04/13/24 1102    ScheduledamLODIPine, 10 mg, Daily  apixaban, 2.5 mg, BID  aspirin, 81 mg, Daily  clopidogreL, 75 mg, Daily  diclofenac sodium, 2 g, TID  hydrALAZINE, 50 mg, Q8H  LIDOcaine, 1 patch, Q24H  losartan, 50 mg, Daily  metoprolol tartrate, 12.5 mg, BID  senna-docusate 8.6-50 mg, 1 tablet, Daily    PRNacetaminophen, 650 mg, Q6H PRN  baclofen, 5 mg, BID PRN  hydrALAZINE, 10 mg, Q4H PRN  labetalol, 10 mg, Q4H PRN  magnesium oxide, 800 mg, PRN  magnesium oxide, 800 mg, PRN  ondansetron, 4 mg, Q6H PRN  potassium bicarbonate, 35 mEq, PRN  potassium bicarbonate, 50 mEq, PRN  potassium bicarbonate, 60 mEq, PRN  potassium, sodium phosphates, 2 packet, PRN  potassium, sodium phosphates, 2 packet, PRN  potassium, sodium phosphates, 2 packet, PRN      Today I personally reviewed pertinent medications, lines/drains/airways, laboratory results, notably:    Laboratory:  CBC:  Recent Labs   Lab 04/13/24  0321   WBC 13.64*   RBC 3.62*   HGB 12.0   HCT 35.7*      MCV 99*   MCH 33.1*   MCHC 33.6       CMP:  Recent Labs   Lab 04/13/24  0321   CALCIUM 9.0   ALBUMIN 3.3*   PROT 6.2      K 3.6   CO2 23   *   BUN 11   CREATININE 0.9   ALKPHOS 75   ALT 5*   AST 18   BILITOT 1.0     Recent Labs   Lab 04/13/24  0321   MG 1.9   PHOS 2.6*       Urinalysis:  Recent Labs   Lab 04/13/24  1010   COLORU Colorless*   SPECGRAV 1.010   PHUR 7.0   OCCULTUA Negative   GLUCUA Negative   KETONESU Negative   PROTEINUA Negative   BILIRUBINUA  Negative       Diet  Diet Adult Regular (IDDSI Level 7)  Diet Adult Regular (IDDSI Level 7)

## 2024-04-13 NOTE — PROGRESS NOTES
[facility] - Neuro Critical Care  Neurocritical Care  Progress Note    Admit Date: 4/11/2024  Service Date: 04/13/2024  Length of Stay: 2    Subjective:     Chief Complaint: Aneurysm of internal carotid artery    History of Present Illness: Ms. Faviola Coughlin is a 74 y/o F with a PMHx of HTN, Factor V Leiden c/b DVT, and R ICA aneurysms who presents to [facility] s/p elective Neuro IR to secure R ICA aneurysms. The aneurysms were incidentally found in 2022 following acute LSW prior to a cholecystectomy. MRI/MRA were performed at that time, showing a remote R frontal infarct, an old R insular hemorrhage, as well as incidentally found 4-5 mm R supraclinoid ICA aneurysm without rupture. She was seen by IR, Dr. Hardy in August 2022 who recommended observation rather than treatment due to her elevated stroke risk and low risk of rupture, with MRA every 1-2 years. She recently had MRA done by her PCP, which was concerning for small amount of aneurysmal growth compared to prior scan. She underwent a pipeline flow diversion assisted coiling and angioplasty within the flow diverter of saccular R supraclinoid segment aneurysm and two R cavernous segment saccular aneurysms of the R ICA.    She is admitted to [facility] for hourly neuromonitoring and a higher level of care.       Hospital Course: 04/12/2024: 74 y/o F with a PMHx of HTN, Factor V Leiden c/b DVT, and R ICA aneurysms who presents to [facility] s/p elective Neuro IR to secure R ICA aneurysms.  She underwent a pipeline flow diversion assisted coiling and angioplasty within the flow diverter of saccular R supraclinoid segment aneurysm and two R cavernous segment saccular aneurysms of the R ICA. Tolerated procedure well without immediate complication, no complications during POD1. Stable for discharge but DME not able to be delivered  to house, and this was not discovered until too late in shift to step down to a hospital service.   04/13/2024: JAMES. Pending rolling walker and bedside  commode delivery for discharge. Remains stable for discharge.     Interval History: See hospital course above    Review of Systems   Constitutional:  Negative for chills, fatigue and fever.   HENT:  Positive for sore throat. Negative for trouble swallowing.    Eyes:  Negative for visual disturbance.   Respiratory:  Negative for shortness of breath.    Cardiovascular:  Negative for chest pain.   Gastrointestinal:  Negative for nausea and vomiting.   Genitourinary:  Positive for dysuria.   Musculoskeletal:  Negative for back pain and neck pain.   Neurological:  Positive for weakness. Negative for dizziness, facial asymmetry, speech difficulty, numbness and headaches.   Psychiatric/Behavioral:  Negative for agitation and confusion.       Objective:     Vitals:  Temp: 98 °F (36.7 °C)  Pulse: 82  Rhythm: normal sinus rhythm  BP: 123/61  MAP (mmHg): 85  Resp: 20  SpO2: 95 %    Temp  Min: 97.5 °F (36.4 °C)  Max: 98.2 °F (36.8 °C)  Pulse  Min: 69  Max: 96  BP  Min: 120/57  Max: 161/82  MAP (mmHg)  Min: 81  Max: 112  Resp  Min: 5  Max: 26  SpO2  Min: 93 %  Max: 100 %    04/12 0701 - 04/13 0700  In: 1543.7 [I.V.:1543.7]  Out: 2200 [Urine:2200]            Physical Exam  Vitals and nursing note reviewed.   Constitutional:       General: She is not in acute distress.     Appearance: Normal appearance.      Comments: Elderly female. Well developed. Well nourished. Resting comfortably in bed.   HENT:      Head: Normocephalic and atraumatic.      Right Ear: External ear normal.      Left Ear: External ear normal.      Nose: Nose normal.      Mouth/Throat:      Mouth: Mucous membranes are moist.      Pharynx: Oropharynx is clear.   Eyes:      General: No scleral icterus.     Extraocular Movements: Extraocular movements intact.      Pupils: Pupils are equal, round, and reactive to light.   Cardiovascular:      Rate and Rhythm: Normal rate and regular rhythm.   Pulmonary:      Effort: Pulmonary effort is normal. No respiratory  distress.   Abdominal:      General: There is no distension.   Musculoskeletal:      Right lower leg: No edema.      Left lower leg: No edema.   Skin:     General: Skin is warm and dry.   Neurological:      Mental Status: She is alert.      Comments: [id number]  AA&Ox4. Speech fluent. Answers questions appropriately. Follows commands briskly.  PERRL. EOMI. CN II-XII grossly intact.  HIMANSHU & AG. SILT in all 4 extremities. No pronator drift.        Gait & coordination exams deferred.        Medications:  Continuoussodium chloride 0.9%, Last Rate: 75 mL/hr at 04/13/24 1102    ScheduledamLODIPine, 10 mg, Daily  apixaban, 2.5 mg, BID  aspirin, 81 mg, Daily  clopidogreL, 75 mg, Daily  diclofenac sodium, 2 g, TID  hydrALAZINE, 50 mg, Q8H  LIDOcaine, 1 patch, Q24H  losartan, 50 mg, Daily  metoprolol tartrate, 12.5 mg, BID  senna-docusate 8.6-50 mg, 1 tablet, Daily    PRNacetaminophen, 650 mg, Q6H PRN  baclofen, 5 mg, BID PRN  hydrALAZINE, 10 mg, Q4H PRN  labetalol, 10 mg, Q4H PRN  magnesium oxide, 800 mg, PRN  magnesium oxide, 800 mg, PRN  ondansetron, 4 mg, Q6H PRN  potassium bicarbonate, 35 mEq, PRN  potassium bicarbonate, 50 mEq, PRN  potassium bicarbonate, 60 mEq, PRN  potassium, sodium phosphates, 2 packet, PRN  potassium, sodium phosphates, 2 packet, PRN  potassium, sodium phosphates, 2 packet, PRN      Today I personally reviewed pertinent medications, lines/drains/airways, laboratory results, notably:    Laboratory:  CBC:  Recent Labs   Lab 04/13/24  0321   WBC 13.64*   RBC 3.62*   HGB 12.0   HCT 35.7*      MCV 99*   MCH 33.1*   MCHC 33.6       CMP:  Recent Labs   Lab 04/13/24  0321   CALCIUM 9.0   ALBUMIN 3.3*   PROT 6.2      K 3.6   CO2 23   *   BUN 11   CREATININE 0.9   ALKPHOS 75   ALT 5*   AST 18   BILITOT 1.0     Recent Labs   Lab 04/13/24  0321   MG 1.9   PHOS 2.6*       Urinalysis:  Recent Labs   Lab 04/13/24  1010   COLORU Colorless*   SPECGRAV 1.010   PHUR 7.0   OCCULTUA Negative   GLUCUA  Negative   KETONESU Negative   PROTEINUA Negative   BILIRUBINUA Negative       Diet  Diet Adult Regular (IDDSI Level 7)  Diet Adult Regular (IDDSI Level 7)        Assessment/Plan:     Neuro  History of stroke with current residual effects  LSW  PT/OT  Rolling walker needed on discharge    Cardiac/Vascular  * Aneurysm of internal carotid artery  72 y/o F presents to [facility] s/p elective pipeline flow diversion assisted coiling and angioplasty within the flow diverter of saccular R supraclinoid segment aneurysm and two R cavernous segment saccular aneurysms of the R ICA.     -Admitted to [facility]  -NeuroIR following  -q1h neuro checks, vital checks  -Daily CBC, CMP, mag, phos while in hospital  -SBP < 140, prn labetalol and hydralazine  -Resume DAPT  -SCDs, apixiban for hx of Factor V Leiden  -PT/OT/SLP as appropriate  -Recommending low-intensity with home health  -Pending delivery of bedside commode and rolling walker, which is needed for lower extremity weakness  -Remains stable for discharge home with home health and outpatient follow up      Essential hypertension  SBP < 140 in acute post-IR setting  PRN labetalol, hydralazine  Resume home medications as appropriate    Hematology  History of DVT (deep vein thrombosis)  See Factor V Leiden    Factor V Leiden  Resume home eliquis 2.5 bid            The patient is being Prophylaxed for:  Venous Thromboembolism with: Mechanical or Chemical  Stress Ulcer with: Not Applicable   Ventilator Pneumonia with: not applicable    Activity Orders            Diet Adult Regular (IDDSI Level 7): Regular starting at 04/12 0022    Progressive Mobility Protocol (mobilize patient to their highest level of functioning at least twice daily) starting at 04/11 2000          Full Code    Stefanie Abbott PA-C  Neurocritical Care  [facility] - Neuro Critical Care

## 2024-04-15 LAB
POC ACTIVATED CLOTTING TIME K: 250 SEC (ref 74–137)
SAMPLE: ABNORMAL

## 2024-04-17 ENCOUNTER — TELEPHONE (OUTPATIENT)
Dept: NEUROSURGERY | Facility: CLINIC | Age: 74
End: 2024-04-17
Payer: MEDICARE

## 2024-04-17 NOTE — TELEPHONE ENCOUNTER
Returned called to Arina. Arina stated that the pt noted that she has a suture at the groin area where they did angiogram. Advised the nurse and informed that we usually do not do any suture for angiogram. Arina v/renée

## 2024-04-17 NOTE — TELEPHONE ENCOUNTER
----- Message from [employer] sent at 4/17/2024  3:56 PM CDT -----  Regarding: Suture repair  Contact: Arina [phone] or [phone]  Arina with [facility] NP Viji Peña is calling someone in provider's office in ref to suture repair on 4/11 to make a repair. Arina is wanting to ask about the suture. Best Call Back Number: Arina [phone] or [phone]

## 2024-04-24 ENCOUNTER — TELEPHONE (OUTPATIENT)
Dept: NEUROSURGERY | Facility: CLINIC | Age: 74
End: 2024-04-24
Payer: MEDICARE

## 2024-04-24 NOTE — TELEPHONE ENCOUNTER
Future Appointments   Date Time Provider Department Center   4/24/2024 10:00 AM Dilma Bustos, RAN [facility] NEUROS8 [facility]   5/20/2024  3:00 PM [facility] OIC-MRI1 [facility]   5/20/2024  4:00 PM Mary Jo Godoy MD [facility] NEUROS8 [facility]   Pt not able to sign on for visit due to internet problems. Faviola Gutierrez a 73 y.o. female  for 2 week post op wound check.    Surgery: Pt is POD 14 FROM angiogram  on 04/14/24 by Dr. Godoy.    DME: none    Brace in Use: none    SUBJECTIVE    New Symptoms: pt states she has 2 clear sutures in her puncture site. Instructed that clear sutures are dissolvable sutures. If they are bothering her she may go to pcp, ed, or urgent care and have them removed. Pt and dtr vu.       PAIN MANAGEMENT     0, pt does c/o slight headache. Instructed otc tylenol or motrin      INCISION (S)    Location: right groin    Incision Status: Clean, Dry, RAOUL. Edges well-approximated. No drainage or swelling noted.     PICTURE    INTERVENTION    Incision: None    Medication Refills Requested: None     EDUCATION    Reviewed Post Op instructions with patient/caregiver.  Keep puncture site RAOUL, no lotions, creams or bandages.  Ok to shower without direct water pressure to incision. Pat dry after shower.  Patient encouraged to walk as much as possible but advised to walk with someone and rest as necessary.  Take over the counter stool softner/laxative for constipation.  Call your doctor or report to the Emergency Room for any signs of infection, including: increased redness, drainage, pain or fever (temperature greater than or equal to 101.5 for 24 hours). Call doctor or go to the Emergency Room if there are any localized neurological changes; problems with speech, vision, numbness, tingling, weakness, or severe headache; or for other concerns.      All questions answered. Pt/caregiver encouraged to call clinic or send message through portal with any future concerns. Patient/caregiver verbalized  understanding.

## 2024-05-15 ENCOUNTER — PATIENT MESSAGE (OUTPATIENT)
Dept: NEUROSURGERY | Facility: CLINIC | Age: 74
End: 2024-05-15
Payer: MEDICARE

## 2024-05-15 DIAGNOSIS — I67.1 RIGHT INTERNAL CAROTID ARTERY ANEURYSM: Primary | ICD-10-CM

## 2024-05-15 RX ORDER — PREDNISONE 50 MG/1
TABLET ORAL
Qty: 3 TABLET | Refills: 0 | Status: SHIPPED | OUTPATIENT
Start: 2024-05-15 | End: 2024-05-21 | Stop reason: SDUPTHER

## 2024-05-15 RX ORDER — DIPHENHYDRAMINE HCL 50 MG
CAPSULE ORAL
Qty: 1 CAPSULE | Refills: 0 | Status: SHIPPED | OUTPATIENT
Start: 2024-05-15 | End: 2024-05-21 | Stop reason: SDUPTHER

## 2024-05-20 ENCOUNTER — HOSPITAL ENCOUNTER (OUTPATIENT)
Dept: RADIOLOGY | Facility: HOSPITAL | Age: 74
Discharge: HOME | End: 2024-05-20
Attending: NEUROLOGICAL SURGERY
Payer: MEDICARE

## 2024-05-20 ENCOUNTER — OFFICE VISIT (OUTPATIENT)
Dept: NEUROSURGERY | Facility: CLINIC | Age: 74
End: 2024-05-20
Payer: MEDICARE

## 2024-05-20 DIAGNOSIS — I67.1 RIGHT INTERNAL CAROTID ARTERY ANEURYSM: ICD-10-CM

## 2024-05-20 DIAGNOSIS — I67.1 RIGHT INTERNAL CAROTID ARTERY ANEURYSM: Primary | ICD-10-CM

## 2024-05-20 PROCEDURE — A9585 GADOBUTROL INJECTION: HCPCS | Performed by: NEUROLOGICAL SURGERY

## 2024-05-20 PROCEDURE — 99215 OFFICE O/P EST HI 40 MIN: CPT | Mod: S$PBB,,, | Performed by: NEUROLOGICAL SURGERY

## 2024-05-20 PROCEDURE — 99999 PR PBB SHADOW E&M-EST. PATIENT-LVL II: CPT | Mod: PBBFAC,,, | Performed by: NEUROLOGICAL SURGERY

## 2024-05-20 PROCEDURE — 99212 OFFICE O/P EST SF 10 MIN: CPT | Mod: PBBFAC | Performed by: NEUROLOGICAL SURGERY

## 2024-05-20 PROCEDURE — 70546 MR ANGIOGRAPH HEAD W/O&W/DYE: CPT | Mod: 26,,, | Performed by: STUDENT IN AN ORGANIZED HEALTH CARE EDUCATION/TRAINING PROGRAM

## 2024-05-20 PROCEDURE — 25500020 PHARM REV CODE 255: Performed by: NEUROLOGICAL SURGERY

## 2024-05-20 PROCEDURE — 70546 MR ANGIOGRAPH HEAD W/O&W/DYE: CPT | Mod: TC

## 2024-05-20 RX ORDER — GADOBUTROL 604.72 MG/ML
8 INJECTION INTRAVENOUS
Status: COMPLETED | OUTPATIENT
Start: 2024-05-20 | End: 2024-05-20

## 2024-05-20 RX ADMIN — GADOBUTROL 8 ML: 604.72 INJECTION INTRAVENOUS at 02:05

## 2024-05-20 NOTE — PROGRESS NOTES
Neurosurgery  Established Patient    Scribe Attestation  I, Renuka Hurst, attest that this documentation has been prepared under the direction and in the presence of Mary Jo Godoy MD.     SUBJECTIVE:     History of Present Illness 10/16/23 (per Raeann Garcia PA-C):  Faviola Coughlin is a 73 y.o. female who presents as a referral from Viji Peña NP for evaluation of incidentally found aneurysm. Pt was noted to have left sided weakness in early 2022 prior to cholecystectomy, concerning for stroke, MRI/MRA of the brain was done, which showed remote R frontal infarct, old R insular hemorrhage, as well as incidentally found 4-5 mm R supraclinoid ICA aneurysm without rupture. She was seen by IR, Dr. Hardy, in August 2022, who recommended observation rather than treatment due to her elevated stroke risk and low risk of rupture, with MRA every 1-2 years. Pt recently had MRA done by her PCP, which was concerning for small amount of aneurysmal growth compared to prior scan, so was referred here for further management. Of note, pt has a hx of Factor V Leiden for which she is chronically on Eliquis. She also has hx of polymyalgia rheumatica, follows with Rheumatology. She has mild residual LSW, as well as residual L facial weakness, was previously diagnosed with Bell's Palsy.      Pt is a non-smoker.  No known family hx of aneurysms.     Interval history 5/20/24:  Patient presents today for f/u after angiogram done on 4/11/24. Imaging shows successful coil assisted, flow diversion of right ICA aneurysms, in communicating segment. Today she reports experiencing some headaches once in a while that localizes around her head but they are tolerable. No other acute or focal neurological issues to report. Patient endorses compliance with aspirin 81 mg, Plavix, and Eliquis. She is accompanied by her daughter.      Review of patient's allergies indicates:   Allergen Reactions    Iodinated contrast media Rash        Current Outpatient Medications   Medication Sig Dispense Refill    amLODIPine (NORVASC) 2.5 MG tablet Take 2.5 mg by mouth once daily.      apixaban (ELIQUIS) 2.5 mg Tab Take by mouth 2 (two) times daily.      aspirin (ECOTRIN) 81 MG EC tablet Take 1 tablet (81 mg total) by mouth once daily. Pt to take otc. To be taken with Plavix 30 tablet 0    cetirizine (ZYRTEC) 10 MG tablet Take 10 mg by mouth once daily.      clopidogreL (PLAVIX) 75 mg tablet Take 1 tablet (75 mg total) by mouth once daily. 30 tablet 11    diphenhydrAMINE (BENADRYL) 50 MG capsule Take over the counter benadryl 50mg 1 hour before test. 1 capsule 0    DULoxetine (CYMBALTA) 30 MG capsule Take 30 mg by mouth once daily.      predniSONE (DELTASONE) 50 MG Tab Take 1 tablet (50 mg) 13 hours before test. Take 1 tablet 7 hours before test. Take 1 tablet 1 hour before test. Take over the counter Benadryl 50mg 1 hour before test. 3 tablet 0    walker (ULTRA-LIGHT ROLLATOR) Misc 1 Box by Misc.(Non-Drug; Combo Route) route once daily. 1 each 0     No current facility-administered medications for this visit.       Past Medical History:   Diagnosis Date    Arthritis     Hypertension     Stroke      Past Surgical History:   Procedure Laterality Date    CHOLECYSTECTOMY  2022     Family History    None       Social History     Socioeconomic History    Marital status:    Tobacco Use    Smoking status: Never    Smokeless tobacco: Never   Substance and Sexual Activity    Alcohol use: Never    Drug use: Never     Social Determinants of Health     Financial Resource Strain: Low Risk  (4/12/2024)    Overall Financial Resource Strain (CARDIA)     Difficulty of Paying Living Expenses: Not hard at all   Food Insecurity: No Food Insecurity (4/12/2024)    Hunger Vital Sign     Worried About Running Out of Food in the Last Year: Never true     Ran Out of Food in the Last Year: Never true   Transportation Needs: No Transportation Needs (4/12/2024)    PRAPARE -  Transportation     Lack of Transportation (Medical): No     Lack of Transportation (Non-Medical): No   Physical Activity: Inactive (4/12/2024)    Exercise Vital Sign     Days of Exercise per Week: 0 days     Minutes of Exercise per Session: 0 min   Stress: No Stress Concern Present (4/12/2024)    [employer] [place] [employer] - Occupational Stress Questionnaire     Feeling of Stress : Only a little   Housing Stability: Low Risk  (4/12/2024)    Housing Stability Vital Sign     Unable to Pay for Housing in the Last Year: No     Number of Places Lived in the Last Year: 1     Unstable Housing in the Last Year: No       Review of Systems   Neurological:  Positive for headaches.   All other systems reviewed and are negative.      OBJECTIVE:     Vital Signs     There is no height or weight on file to calculate BMI.    Neurosurgery Physical Exam  General: no acute distress  Head: Non-traumatic, normocephalic  Eyes: Pupils equal, EOMI  Neck: Supple, normal ROM, no tenderness to palpation  CVS: Normal rate and rhythm, distal pulses present  Pulm: Symmetric expansion, no respiratory distress  GI: Abdomen nondistended, nontender    MSK: Moves all extremities without restriction, atraumatic  Skin: Dry, intact  Psych: Normal thought content and cognition    Neuro:  Alert, awake, oriented, to self, place, time  Language:  Speech is fluent, goal directed without any noted dysarthria or aphasia    Cranial nerves:    CNII-XII: Intact on fine exam,   Pupils equal round react to light,   Extraocular muscles are intact  V1 to V3 is intact to light touch,   no facial asymmetry,   Hearing is intact to finger rub and voice  tongue/uvula/palate midline,   shoulder shrug equal,     No pronator drift    Extremities:  Motor:  Upper Extremity    Deltoid Triceps Biceps Wrist  Extension Wrist  Flexion Interosseous   R 5/5 5/5 5/5 5/5 5/5 5/5   L 5/5 5/5 5/5 5/5 5/5 5/5       Thumb   Abduction Thumb  ADDuction Finger  Flexion  Finger  Extension     R 5/5 5/5 5/5 5/5     L 5/5 5/5 5/5 5/5        Lower Extremity     Iliopsoas Quadriceps Hamstring Plantarflexion Dorsiflexion EHL   R 5/5 5/5 5/5 5/5 5/5 5/5   L 5/5 5/5 5/5 5/5 5/5 5/5       Reflexes:     DTR: 2+ biceps    2+ triceps   2+ brachioradialis   2+ patellar  2+ Achilles     Bertrand's: Negative     Babinski's: Negative     Clonus: Negative     Sensory:      Sensation intact to light touch, temperature sensation, vibration, pinprick     Coordination:      Coordination intact throughout, no dysmetria, normal rapid alternating movements, no dysdiadochokinesia  Cerebellar:  Normal finger-to-nose, normal heel-to-shin  Cervical spine:  No midline cervical tenderness, negative Lhermitte, negative Spurling  Thoracic spine:  No midline thoracic tenderness  Lumbar spine:  No midline lumbar tenderness     Diagnostic Results:  I personally reviewed the patient's Diagnostic Imaging.     MRA Velia W WO Contrast 5/20/24  Similar appearance of coiled right supraclinoid ICA aneurysm.  No abnormal enhancement to suggest residual aneurysm.    IR Angiogram Carotid Cerebral Bilateral 4/11/24  Successful coil assisted, flow diversion of right ICA aneurysms, in communicating segment. There were no complications      ASSESSMENT/PLAN:   74 y/o F right supraclinoid ICA aneurysm s/p coils and pipeline placement.    Patient with no neurological deficits on examination here at clinic.   Discussed imaging from MRA Brain and angiogram revealing successful coil assisted, flow diversion of right ICA aneurysms with no complications.  After discussion with the patient and her daughter, I recommend follow up in 3 months with angiogram . I also discussed with the patient continuing compliance with aspirin 81 mg and Plavix. She can stop taking Eliquis. I have answered all of their questions and patient wish to proceed with this plan. We will schedule patient.      Thank you so very much for allowing me to participate in  the care of this patient.  Please feel free to call any questions, comments, or concerns.     Mary Jo Godoy MD,MSc  Department of Neurosurgery   Department of Radiology  Department of Neurology  [facility]    [facility]   [employer] [employer] [facility]     Total time spent in counseling and discussion about further management options including relevant lab work, treatment,  prognosis, medications and intended side effects was more than 60 minutes. More than 50 % of the time was spent in counseling and coordination of care.  I spent a total of 60 minutes on the day of the visit.This includes face to face time and non-face to face time preparing to see the patient (eg, review of tests), Obtaining and/or reviewing separately obtained history, Documenting clinical information in the electronic or other health record, Independently interpreting resultsand communicating results to the patient/family/caregiver, or Care coordination      Scribe Attestation  I, Dr.Vernard Godoy personally performed the services described in this documentation. All medical record entries made by the scribe, Renuka Hurst, were at my direction and in my presence.  I have reviewed the chart and agree that the record reflects my personal performance and is accurate and complete.

## 2024-05-21 ENCOUNTER — TELEPHONE (OUTPATIENT)
Dept: NEUROSURGERY | Facility: CLINIC | Age: 74
End: 2024-05-21
Payer: MEDICARE

## 2024-05-21 DIAGNOSIS — I67.1 RIGHT INTERNAL CAROTID ARTERY ANEURYSM: ICD-10-CM

## 2024-05-21 DIAGNOSIS — I67.1 RIGHT INTERNAL CAROTID ARTERY ANEURYSM: Primary | ICD-10-CM

## 2024-05-24 RX ORDER — PREDNISONE 50 MG/1
TABLET ORAL
Qty: 3 TABLET | Refills: 0 | Status: SHIPPED | OUTPATIENT
Start: 2024-05-24

## 2024-05-24 RX ORDER — DIPHENHYDRAMINE HCL 50 MG
CAPSULE ORAL
Start: 2024-05-24

## 2024-05-25 ENCOUNTER — PATIENT MESSAGE (OUTPATIENT)
Dept: NEUROSURGERY | Facility: CLINIC | Age: 74
End: 2024-05-25
Payer: MEDICARE

## 2024-05-29 NOTE — TELEPHONE ENCOUNTER
Called and spoke with pt and family member. Explained Dr. Godoy stated can stop plavix for arthroscopic procedure but for any major surgery can not stop for 6 months. Pt stated they are doing arthroscopic but with repairs. Explained I will speak with Dr. Godoy on Monday and get back with her then. Pt rose.

## 2024-06-03 ENCOUNTER — TELEPHONE (OUTPATIENT)
Dept: NEUROSURGERY | Facility: CLINIC | Age: 74
End: 2024-06-03
Payer: MEDICARE

## 2024-06-03 NOTE — TELEPHONE ENCOUNTER
Spoke with Dr. Godoy to clarify if pt could stop plavix  a week for arthroscopy. Dr. Godoy stated no pt could not stop she will have a stroke. Called and spoke with pt. Explained to pt not able to stop plavix and there is no bridge for plavix. Pt rose.

## 2024-06-21 ENCOUNTER — PATIENT MESSAGE (OUTPATIENT)
Dept: NEUROSURGERY | Facility: CLINIC | Age: 74
End: 2024-06-21
Payer: MEDICARE

## 2024-06-25 ENCOUNTER — PATIENT MESSAGE (OUTPATIENT)
Dept: NEUROSURGERY | Facility: CLINIC | Age: 74
End: 2024-06-25
Payer: MEDICARE

## 2024-07-16 ENCOUNTER — TELEPHONE (OUTPATIENT)
Dept: NEUROSURGERY | Facility: CLINIC | Age: 74
End: 2024-07-16
Payer: MEDICARE

## 2024-07-16 ENCOUNTER — PATIENT MESSAGE (OUTPATIENT)
Dept: INTERVENTIONAL RADIOLOGY/VASCULAR | Facility: HOSPITAL | Age: 74
End: 2024-07-16
Payer: MEDICARE

## 2024-07-16 NOTE — TELEPHONE ENCOUNTER
Pt asked what meds should she take on the day of the procedure.  stated that pt can take her medications after the procedure. Pt can take plavix and aspirin with small sip of water but hold the eliquis. Pt v/u

## 2024-07-16 NOTE — TELEPHONE ENCOUNTER
----- Message from Candy Almeida sent at 7/16/2024 12:49 PM CDT -----  Regarding: Patient advice  Contact: Pt  Pt called in regards to questions/concerns about medications to take before procedure       Pt can be reached at [phone]

## 2024-07-17 ENCOUNTER — LAB VISIT (OUTPATIENT)
Dept: LAB | Facility: HOSPITAL | Age: 74
End: 2024-07-17
Attending: NEUROLOGICAL SURGERY
Payer: MEDICARE

## 2024-07-17 ENCOUNTER — TELEPHONE (OUTPATIENT)
Dept: INTERVENTIONAL RADIOLOGY/VASCULAR | Facility: HOSPITAL | Age: 74
End: 2024-07-17
Payer: MEDICARE

## 2024-07-17 DIAGNOSIS — I67.1 RIGHT INTERNAL CAROTID ARTERY ANEURYSM: ICD-10-CM

## 2024-07-17 LAB
ANION GAP SERPL CALC-SCNC: 8 MMOL/L (ref 8–16)
BUN SERPL-MCNC: 13 MG/DL (ref 8–23)
CALCIUM SERPL-MCNC: 9.6 MG/DL (ref 8.7–10.5)
CHLORIDE SERPL-SCNC: 101 MMOL/L (ref 95–110)
CO2 SERPL-SCNC: 31 MMOL/L (ref 23–29)
CREAT SERPL-MCNC: 1.1 MG/DL (ref 0.5–1.4)
EST. GFR  (NO RACE VARIABLE): 52.1 ML/MIN/1.73 M^2
GLUCOSE SERPL-MCNC: 96 MG/DL (ref 70–110)
POTASSIUM SERPL-SCNC: 3.2 MMOL/L (ref 3.5–5.1)
SODIUM SERPL-SCNC: 140 MMOL/L (ref 136–145)

## 2024-07-17 PROCEDURE — 80048 BASIC METABOLIC PNL TOTAL CA: CPT | Performed by: NEUROLOGICAL SURGERY

## 2024-07-17 PROCEDURE — 36415 COLL VENOUS BLD VENIPUNCTURE: CPT | Performed by: NEUROLOGICAL SURGERY

## 2024-07-17 NOTE — NURSING
Pre-procedure call complete.  2 patient identifier used (name and ).  Pt instructed not to eat or drink anything after midnight the night before procedure.  Pt aware will need someone to provide transport home and monitor pt 8 hours post procedure.  No driving for 3 days after procedure.   Patient advised to take blood pressure, heart medications,  with a sip of water morning of procedure.  Patient verbalized aware of which medications to take.  Do not take  sleep medication (including OTC) and anxiety medication the night before procedure.  Arrival time and location given.  Expected length of stay reviewed.  Covid screening completed.  Pt verbalized understanding of all pre-procedure instructions.  Written instructions and directions sent to patient in [email or web address].

## 2024-07-18 ENCOUNTER — HOSPITAL ENCOUNTER (OUTPATIENT)
Dept: INTERVENTIONAL RADIOLOGY/VASCULAR | Facility: HOSPITAL | Age: 74
Discharge: HOME | End: 2024-07-18
Attending: NEUROLOGICAL SURGERY | Admitting: NEUROLOGICAL SURGERY
Payer: MEDICARE

## 2024-07-18 VITALS
SYSTOLIC BLOOD PRESSURE: 160 MMHG | RESPIRATION RATE: 17 BRPM | HEART RATE: 77 BPM | TEMPERATURE: 97 F | OXYGEN SATURATION: 97 % | DIASTOLIC BLOOD PRESSURE: 76 MMHG

## 2024-07-18 DIAGNOSIS — I67.1 RIGHT INTERNAL CAROTID ARTERY ANEURYSM: ICD-10-CM

## 2024-07-18 PROCEDURE — 63600175 PHARM REV CODE 636 W HCPCS: Performed by: NEUROLOGICAL SURGERY

## 2024-07-18 PROCEDURE — C1769 GUIDE WIRE: HCPCS

## 2024-07-18 PROCEDURE — 25500020 PHARM REV CODE 255: Performed by: NEUROLOGICAL SURGERY

## 2024-07-18 RX ORDER — FENTANYL CITRATE 50 UG/ML
INJECTION, SOLUTION INTRAMUSCULAR; INTRAVENOUS
Status: COMPLETED | OUTPATIENT
Start: 2024-07-18 | End: 2024-07-18

## 2024-07-18 RX ORDER — IODIXANOL 320 MG/ML
50 INJECTION, SOLUTION INTRAVASCULAR
Status: COMPLETED | OUTPATIENT
Start: 2024-07-18 | End: 2024-07-18

## 2024-07-18 RX ORDER — LIDOCAINE HYDROCHLORIDE 10 MG/ML
1 INJECTION, SOLUTION EPIDURAL; INFILTRATION; INTRACAUDAL; PERINEURAL ONCE
Status: DISCONTINUED | OUTPATIENT
Start: 2024-07-18 | End: 2024-07-19 | Stop reason: HOSPADM

## 2024-07-18 RX ORDER — SODIUM CHLORIDE 9 MG/ML
INJECTION, SOLUTION INTRAVENOUS CONTINUOUS
Status: DISCONTINUED | OUTPATIENT
Start: 2024-07-18 | End: 2024-07-19 | Stop reason: HOSPADM

## 2024-07-18 RX ORDER — SODIUM CHLORIDE 0.9 % (FLUSH) 0.9 %
10 SYRINGE (ML) INJECTION
Status: DISCONTINUED | OUTPATIENT
Start: 2024-07-18 | End: 2024-07-19 | Stop reason: HOSPADM

## 2024-07-18 RX ORDER — MIDAZOLAM HYDROCHLORIDE 1 MG/ML
INJECTION, SOLUTION INTRAMUSCULAR; INTRAVENOUS
Status: COMPLETED | OUTPATIENT
Start: 2024-07-18 | End: 2024-07-18

## 2024-07-18 RX ORDER — HEPARIN SODIUM 1000 [USP'U]/ML
INJECTION, SOLUTION INTRAVENOUS; SUBCUTANEOUS
Status: COMPLETED | OUTPATIENT
Start: 2024-07-18 | End: 2024-07-18

## 2024-07-18 RX ADMIN — HEPARIN SODIUM 2000 UNITS: 1000 INJECTION, SOLUTION INTRAVENOUS; SUBCUTANEOUS at 10:07

## 2024-07-18 RX ADMIN — FENTANYL CITRATE 50 MCG: 50 INJECTION, SOLUTION INTRAMUSCULAR; INTRAVENOUS at 09:07

## 2024-07-18 RX ADMIN — IODIXANOL 50 ML: 320 INJECTION, SOLUTION INTRAVASCULAR at 10:07

## 2024-07-18 RX ADMIN — MIDAZOLAM HYDROCHLORIDE 0.5 MG: 2 INJECTION, SOLUTION INTRAMUSCULAR; INTRAVENOUS at 10:07

## 2024-07-18 RX ADMIN — MIDAZOLAM HYDROCHLORIDE 1 MG: 2 INJECTION, SOLUTION INTRAMUSCULAR; INTRAVENOUS at 09:07

## 2024-07-18 RX ADMIN — FENTANYL CITRATE 50 MCG: 50 INJECTION, SOLUTION INTRAMUSCULAR; INTRAVENOUS at 10:07

## 2024-07-18 NOTE — H&P
"[facility]: Main [place]  Inpatient Consult Note  Interventional Neuroradiology       CC: R supraclinoid ICA aneurysm     SUBJECTIVE:     History of Present Illness: per chart" felix Coughlin is a 73 y.o. female who presents as a referral from Viji Peña NP for evaluation of incidentally found aneurysm. Pt was noted to have left sided weakness in early 2022 prior to cholecystectomy, concerning for stroke, MRI/MRA of the brain was done, which showed remote R frontal infarct, old R insular hemorrhage, as well as incidentally found 4-5 mm R supraclinoid ICA aneurysm without rupture. She was seen by IR, Dr. Hardy, in August 2022, who recommended observation rather than treatment due to her elevated stroke risk and low risk of rupture, with MRA every 1-2 years. Pt recently had MRA done by her PCP, which was concerning for small amount of aneurysmal growth compared to prior scan, so was referred here for further management. Of note, pt has a hx of Factor V Leiden for which she is chronically on Eliquis. She also has hx of polymyalgia rheumatica, follows with Rheumatology. She has mild residual LSW, as well as residual L facial weakness, was previously diagnosed with Bell's Palsy.  "     Sedation History: moderate    Past Medical History:   Diagnosis Date    Arthritis     Hypertension     Stroke       Past Surgical History:   Procedure Laterality Date    CHOLECYSTECTOMY  2022      Current Outpatient Medications on File Prior to Encounter   Medication Sig Dispense Refill    amLODIPine (NORVASC) 2.5 MG tablet Take 2.5 mg by mouth once daily.      apixaban (ELIQUIS) 2.5 mg Tab Take by mouth 2 (two) times daily.      aspirin (ECOTRIN) 81 MG EC tablet Take 1 tablet (81 mg total) by mouth once daily. Pt to take otc. To be taken with Plavix 30 tablet 0    cetirizine (ZYRTEC) 10 MG tablet Take 10 mg by mouth once daily.      clopidogreL (PLAVIX) 75 mg tablet Take 1 tablet (75 mg total) by mouth once daily. 30 " "tablet 11    diphenhydrAMINE (BENADRYL) 50 MG capsule Take over the counter benadryl 50mg 1 hour before test.      DULoxetine (CYMBALTA) 30 MG capsule Take 30 mg by mouth once daily.      predniSONE (DELTASONE) 50 MG Tab Take 1 tablet (50 mg) 13 hours before test. Take 1 tablet 7 hours before test. Take 1 tablet 1 hour before test. Take over the counter Benadryl 50mg 1 hour before test. 3 tablet 0    walker (ULTRA-LIGHT ROLLATOR) Misc 1 Box by Misc.(Non-Drug; Combo Route) route once daily. 1 each 0     No current facility-administered medications on file prior to encounter.      Review of patient's allergies indicates:   Allergen Reactions    Iodinated contrast media Rash       No family history on file.    reports that she has never smoked. She has never used smokeless tobacco. She reports that she does not drink alcohol and does not use drugs.     Antiplatelets/Anticoagulants: aspirin/plavix    Review of Systems:  ROS    OBJECTIVE:     Vital Signs (Most Recent):    Vital Signs (24h Range):          Physical Exam:  Physical Exam  Alert and oriented to time, place and person  Face symmetric  No aphasia  No drift in arms and legs  ASA: II  Mallampati: II    Labs:  CBC/Anemia Profile:   No results for input(s): "WBC", "HGB", "HCT", "PLT", "MCV", "RDW", "IRON", "FERRITIN", "RETIC", "FOLATE", "[id number]", "OCCULTBLOOD" in the last 168 hours.    Invalid input(s): "IRONSATURATED"     Coags:   No results for input(s): "PT", "INR", "APTT" in the last 168 hours.     Chemistries:   Recent Labs   Lab 07/17/24  1402      K 3.2*      CO2 31*   BUN 13   CREATININE 1.1   CALCIUM 9.6        Imaging:   MRA April 12, 2024    Normal MRA post coiling of right supraclinoid ICA aneurysm with no significant signal remaining in the coiled aneurysm.        ASSESSMENT/PLAN:   - Informed consent obtained from patient for diagnostic cerebral angiogram. Risk vs benefits discussed with patient. She agrees with procedure.  "     Sedation Plan: Moderate      Imaging reviewed with Radiology staff, Dr. Godoy.            Reid Tian MD  Fellow, NeuroEndovascular Surgery, [facility]  Board certified Vascular Neurologist  [place], [place]

## 2024-07-18 NOTE — PLAN OF CARE
IR recovery completed. NAD noted. VSS. Dressing CDI. Discharge instructions reviewed and acknowledged by patient and daughter. Questions and concerns encouraged and answered. PIV discontinued. Patient discharged, accompanied by family.

## 2024-07-18 NOTE — PLAN OF CARE
Patient arrived to MPU for post procedure, cerebral angiogram, recovery. Report received from RAN Mccloud. NAD noted. Dressing CDI. VSS, afebrile. Patient placed on cardiac and continuous monitoring. Bed at lowest position, side rails x2, call light and belongings within reach.

## 2024-07-18 NOTE — PROCEDURES
Interventional Neuroradiology Post-Procedure Note    Pre Op Diagnosis: Unruptured R supraclinoid ICA aneurysm , S/p MARGARITA assisted coiling    Post Op Diagnosis: Same    Procedure: Diagnostic cerebral angiogram    Procedure performed by: Mary Jo Barbosa MD; Iliana WANG, Renato; Jaylan WANG, Reid    Written Informed Consent Obtained: Yes    Specimen Removed: NO    Estimated Blood Loss: Minimal    Procedure report:     A 5F sheath was placed into the right femoral artery and a 5F Kyle catheter was advanced into the aortic arch.  The right ICA was subselected and angiography of the brain was performed after injection into each of these vessels.    Preliminary interpretation: Adequately placed flow diverter and intraaneurysmal coil construct with no intimal hyperplasia, endoleak. Aneurysm is adequately packed with no intraaneurysmal contrast filling.  Please see Imaging report for full details.    A right femoral artery angiogram was performed, the sheath removed and hemostasis achieved using 5F Mynx.  No hematoma was present at the time of hemostasis.    The patient tolerated the procedure well.     Plan:  -Bed rest for 2h  -Groin check and pulse check q2h   -Avoid carrying heavy weights > 10 lbs x 24 hrs   -Remove groin dressing tomorrow                       Renato Barrientos MD, MHA  Fellow, NeuroEndovascular Surgery, [facility]ata  Neurologist, [facility] [place], [place]

## 2024-07-18 NOTE — PLAN OF CARE
Pt tolerated cerebral angiogram well, rg groin dressing c/d/I, 5fr mynx closure device placed, hemostasis obtain @ 10, bed rest up @ 12, transfer to mpu, report given @ bedside   Interpolation Flap Text: A decision was made to reconstruct the defect utilizing an interpolation axial flap and a staged reconstruction.  A telfa template was made of the defect.  This telfa template was then used to outline the interpolation flap.  The flap was excised through the skin and subcutaneous tissue down to the layer of the underlying musculature.  The interpolation flap was carefully excised within this deep plane to maintain its blood supply.  The edges of the donor site were undermined.   The donor site was closed in a primary fashion.  The pedicle was then rotated into position and sutured.  Once the tube was sutured into place, adequate blood supply was confirmed with blanching and refill.  The pedicle was then wrapped with xeroform gauze and dressed appropriately with a telfa and gauze bandage to ensure continued blood supply and protect the attached pedicle.

## 2024-07-18 NOTE — DISCHARGE INSTRUCTIONS
For scheduling: Call [facility] at [phone]    For questions or concerns call: [facility] 8 AM- 5PM [phone]. Radiology resident on call [phone].    For immediate concerns that are not emergent, you may call our radiology clinic at: [phone]

## 2024-07-22 ENCOUNTER — PATIENT MESSAGE (OUTPATIENT)
Dept: NEUROSURGERY | Facility: CLINIC | Age: 74
End: 2024-07-22
Payer: MEDICARE

## 2024-07-31 ENCOUNTER — CLINICAL SUPPORT (OUTPATIENT)
Dept: NEUROSURGERY | Facility: CLINIC | Age: 74
End: 2024-07-31
Payer: MEDICARE

## 2024-07-31 DIAGNOSIS — I67.1 RIGHT INTERNAL CAROTID ARTERY ANEURYSM: Primary | ICD-10-CM

## 2024-07-31 NOTE — PROGRESS NOTES
Established Patient - Audio Only Telehealth Visit     Faviola Gutierrez a 74 y.o. female for 2 week post op wound check.    Surgery: Pt is POD 14 FROM angiogram on 07/18/24 by Dr. Godoy.    DME: none    SUBJECTIVE    New Symptoms: none      PAIN MANAGEMENT     0,       INCISION (S)    Location: right groin    Incision Status: Clean, Dry, RAOUL. Edges well-approximated. No drainage or swelling noted.     PICTURE    INTERVENTION    Incision: None    Medication Refills Requested: None     EDUCATION    Reviewed Post Op instructions with patient.  Keep incision RAOUL, no lotions, creams or bandages.  Patient encouraged to walk as much as possible but advised to walk with someone and rest as necessary.  Take over the counter stool softner/laxative for constipation.  Call your doctor or report to the Emergency Room for any signs of infection, including: increased redness, drainage, pain or fever (temperature greater than or equal to 101.5 for 24 hours). Call doctor or go to the Emergency Room if there are any localized neurological changes; problems with speech, vision, numbness, tingling, weakness, or severe headache; or for other concerns.      All questions answered. Pt encouraged to call clinic or send message through portal with any future concerns. Patient verbalized understanding.     FOLLOW UP    Future Appointments   Date Time Provider Department Center   7/31/2024 10:30 AM Dilma Bustos RN [facility]   8/27/2024  4:00 PM Mary Jo Godoy MD [facility] NEUROS8 [facility]

## 2024-08-27 ENCOUNTER — TELEPHONE (OUTPATIENT)
Dept: NEUROSURGERY | Facility: CLINIC | Age: 74
End: 2024-08-27

## 2024-08-27 ENCOUNTER — OFFICE VISIT (OUTPATIENT)
Dept: NEUROSURGERY | Facility: CLINIC | Age: 74
End: 2024-08-27
Payer: MEDICARE

## 2024-08-27 DIAGNOSIS — I67.1 RIGHT INTERNAL CAROTID ARTERY ANEURYSM: Primary | ICD-10-CM

## 2024-08-27 PROCEDURE — 99215 OFFICE O/P EST HI 40 MIN: CPT | Mod: S$PBB,,, | Performed by: NEUROLOGICAL SURGERY

## 2024-08-27 NOTE — PROGRESS NOTES
Neurosurgery  Established Patient    SUBJECTIVE:     History of Present Illness:  Faviola Coughlin is a 73 y.o. female who presents as a referral from Viji Peña NP for evaluation of incidentally found aneurysm. Pt was noted to have left sided weakness in early 2022 prior to cholecystectomy, concerning for stroke, MRI/MRA of the brain was done, which showed remote R frontal infarct, old R insular hemorrhage, as well as incidentally found 4-5 mm R supraclinoid ICA aneurysm without rupture. She was seen by IR, Dr. Hardy, in August 2022, who recommended observation rather than treatment due to her elevated stroke risk and low risk of rupture, with MRA every 1-2 years. Pt recently had MRA done by her PCP, which was concerning for small amount of aneurysmal growth compared to prior scan, so was referred here for further management. Of note, pt has a hx of Factor V Leiden for which she is chronically on Eliquis. She also has hx of polymyalgia rheumatica, follows with Rheumatology. She has mild residual LSW, as well as residual L facial weakness, was previously diagnosed with Bell's Palsy.      Pt is a non-smoker.  No known family hx of aneurysms.     Interval history 5/20/24:  Patient presents today for f/u after angiogram done on 4/11/24. Imaging shows successful coil assisted, flow diversion of right ICA aneurysms, in communicating segment. Today she reports experiencing some headaches once in a while that localizes around her head but they are tolerable. No other acute or focal neurological issues to report. Patient endorses compliance with aspirin 81 mg, Plavix, and Eliquis. She is accompanied by her daughter.     Interval history 08/27/2024:   Patient presents after undergoing diagnostic cerebral angiogram.  She is currently on aspirin, Plavix, Eliquis.  She would like to review the results of her cerebral angiogram, as well as discuss possible changes to her antiplatelet / anticoagulation regimen such that  she may undergo left knee surgery.  Patient has had some falls, no evidence of intracranial posttraumatic pathology on imaging as patient noted per her daughter.  She denies any significant headache, nausea, vomiting.  She does note some occasional pain in the right temporal region.  Otherwise no significant issues.    Review of patient's allergies indicates:   Allergen Reactions    Iodinated contrast media Rash       Current Outpatient Medications   Medication Sig Dispense Refill    amLODIPine (NORVASC) 2.5 MG tablet Take 2.5 mg by mouth once daily.      apixaban (ELIQUIS) 2.5 mg Tab Take by mouth 2 (two) times daily.      aspirin (ECOTRIN) 81 MG EC tablet Take 1 tablet (81 mg total) by mouth once daily. Pt to take otc. To be taken with Plavix 30 tablet 0    cetirizine (ZYRTEC) 10 MG tablet Take 10 mg by mouth once daily.      clopidogreL (PLAVIX) 75 mg tablet Take 1 tablet (75 mg total) by mouth once daily. 30 tablet 11    diphenhydrAMINE (BENADRYL) 50 MG capsule Take over the counter benadryl 50mg 1 hour before test.      DULoxetine (CYMBALTA) 30 MG capsule Take 30 mg by mouth once daily.      predniSONE (DELTASONE) 50 MG Tab Take 1 tablet (50 mg) 13 hours before test. Take 1 tablet 7 hours before test. Take 1 tablet 1 hour before test. Take over the counter Benadryl 50mg 1 hour before test. 3 tablet 0    walker (ULTRA-LIGHT ROLLATOR) Misc 1 Box by Misc.(Non-Drug; Combo Route) route once daily. 1 each 0     No current facility-administered medications for this visit.       Past Medical History:   Diagnosis Date    Arthritis     Hypertension     Stroke      Past Surgical History:   Procedure Laterality Date    CHOLECYSTECTOMY  2022     Family History    None       Social History     Socioeconomic History    Marital status:    Tobacco Use    Smoking status: Never    Smokeless tobacco: Never   Substance and Sexual Activity    Alcohol use: Never    Drug use: Never     Social Determinants of Health      Financial Resource Strain: Low Risk  (4/12/2024)    Overall Financial Resource Strain (CARDIA)     Difficulty of Paying Living Expenses: Not hard at all   Food Insecurity: No Food Insecurity (4/12/2024)    Hunger Vital Sign     Worried About Running Out of Food in the Last Year: Never true     Ran Out of Food in the Last Year: Never true   Transportation Needs: No Transportation Needs (4/12/2024)    PRAPARE - Transportation     Lack of Transportation (Medical): No     Lack of Transportation (Non-Medical): No   Physical Activity: Inactive (4/12/2024)    Exercise Vital Sign     Days of Exercise per Week: 0 days     Minutes of Exercise per Session: 0 min   Stress: No Stress Concern Present (4/12/2024)    [employer] [place] [employer] - Occupational Stress Questionnaire     Feeling of Stress : Only a little   Housing Stability: Low Risk  (4/12/2024)    Housing Stability Vital Sign     Unable to Pay for Housing in the Last Year: No     Number of Places Lived in the Last Year: 1     Unstable Housing in the Last Year: No       Review of Systems    OBJECTIVE:     Vital Signs  Pain Score:   7  There is no height or weight on file to calculate BMI.    Neurosurgery Physical Exam  General: no acute distress  Head: Non-traumatic, normocephalic  Eyes: Pupils equal, EOMI  Neck: Supple, normal ROM, no tenderness to palpation  CVS: Normal rate and rhythm, distal pulses present  Pulm: Symmetric expansion, no respiratory distress  GI: Abdomen nondistended, nontender     MSK: Moves all extremities without restriction, atraumatic  Skin: Dry, intact  Psych: Normal thought content and cognition     Neuro:  Alert, awake, oriented, to self, place, time  Language:  Speech is fluent, goal directed without any noted dysarthria or aphasia     Cranial nerves:    CNII-XII: Intact on fine exam,   Pupils equal round react to light,   Extraocular muscles are intact  V1 to V3 is intact to light touch,   no facial asymmetry,   Hearing  is intact to finger rub and voice  tongue/uvula/palate midline,   shoulder shrug equal,      No pronator drift     Extremities:  Motor:           Upper Extremity     Deltoid Triceps Biceps Wrist  Extension Wrist  Flexion Interosseous   R 5/5 5/5 5/5 5/5 5/5 5/5   L 5/5 5/5 5/5 5/5 5/5 5/5         Thumb   Abduction Thumb  ADDuction Finger  Flexion Finger  Extension       R 5/5 5/5 5/5 5/5       L 5/5 5/5 5/5 5/5           Lower Extremity      Iliopsoas Quadriceps Hamstring Plantarflexion Dorsiflexion EHL   R 5/5 5/5 5/5 5/5 5/5 5/5   L 5/5 5/5 5/5 5/5 5/5 5/5         Reflexes:     DTR:    2+ biceps                       2+ triceps              2+ brachioradialis              2+ patellar  2+ Achilles     Bertrand's: Negative     Babinski's: Negative     Clonus: Negative     Sensory:      Sensation intact to light touch, temperature sensation, vibration, pinprick     Coordination:      Coordination intact throughout, no dysmetria, normal rapid alternating movements, no dysdiadochokinesia  Cerebellar:  Normal finger-to-nose, normal heel-to-shin    Diagnostic Results:    I personally reviewed patient's Diagnostic Imaging.  Cerebral angiogram which is now status post flow diversion with coil assist.  No evidence of residual aneurysms.  No evidence of intimal hyperplasia.    ASSESSMENT/PLAN:   73 y/o F right supraclinoid ICA aneurysm s/p   Coil assisted flow diversion.    Patient presents with   Right ICA aneurysms, status post coil assisted flow diversion.  No significant change on clinical exam    Discussed imaging results of  cerebral angiogram.  With no evidence of clear residual after coil assisted flow diversion of her ICA aneurysms.    After discussion with the patient, I recommend   Follow-up MRA with contrast in 3 months.  Did note that patient could discontinue Plavix at this particular time.  And continue with aspirin 81 mg.  Anticoagulation regimen per her primary care physician.  From a neurosurgical standpoint  patient is certainly able to undergo her total knee arthroscopy as appropriate per her orthopedic surgeon. If possible would continue aspirin 81 mg, at the time of her knee arthroscopy.  I have answered all of their questions and patient wish to proceed with this plan. We will schedule patient.      Thank you so very much for allowing me to participate in the care of this patient.  Please feel free to call any questions, comments, or concerns.     Mary Jo Godoy MD,MSc  Department of Neurosurgery   Department of Radiology  Department of Neurology  [facility]    [facility] [employer] [facility] [employer]     Total time spent in counseling and discussion about further management options including relevant lab work, treatment,  prognosis, medications and intended side effects was more than 60 minutes. More than 50 % of the time was spent in counseling and coordination of care.  I spent a total of 60 minutes on the day of the visit.This includes face to face time and non-face to face time preparing to see the patient (eg, review of tests), Obtaining and/or reviewing separately obtained history, Documenting clinical information in the electronic or other health record, Independently interpreting resultsand communicating results to the patient/family/caregiver, or Care coordination       Okay to start Plavix   Aspirin 81 mg   MRA with contrast 6 months.  No evidence of intimal hyperplasia on cerebral angiogram.  No evidence of residual aneurysms filling.    Note dictated with voice recognition software, please excuse any grammatical errors.

## 2024-09-20 ENCOUNTER — PATIENT MESSAGE (OUTPATIENT)
Dept: NEUROSURGERY | Facility: CLINIC | Age: 74
End: 2024-09-20
Payer: MEDICARE

## 2025-04-13 ENCOUNTER — HOSPITAL ENCOUNTER (OUTPATIENT)
Dept: RADIOLOGY | Facility: HOSPITAL | Age: 75
Discharge: HOME | End: 2025-04-13
Attending: NEUROLOGICAL SURGERY
Payer: MEDICARE

## 2025-04-13 DIAGNOSIS — I67.1 RIGHT INTERNAL CAROTID ARTERY ANEURYSM: ICD-10-CM

## 2025-04-13 PROCEDURE — 25500020 PHARM REV CODE 255: Performed by: NEUROLOGICAL SURGERY

## 2025-04-13 PROCEDURE — A9585 GADOBUTROL INJECTION: HCPCS | Performed by: NEUROLOGICAL SURGERY

## 2025-04-13 PROCEDURE — 70546 MR ANGIOGRAPH HEAD W/O&W/DYE: CPT | Mod: 26,,, | Performed by: RADIOLOGY

## 2025-04-13 PROCEDURE — 70546 MR ANGIOGRAPH HEAD W/O&W/DYE: CPT | Mod: TC

## 2025-04-13 RX ORDER — GADOBUTROL 604.72 MG/ML
8 INJECTION INTRAVENOUS
Status: COMPLETED | OUTPATIENT
Start: 2025-04-13 | End: 2025-04-13

## 2025-04-13 RX ADMIN — GADOBUTROL 8 ML: 604.72 INJECTION INTRAVENOUS at 02:04

## 2025-04-15 ENCOUNTER — PATIENT MESSAGE (OUTPATIENT)
Dept: NEUROSURGERY | Facility: CLINIC | Age: 75
End: 2025-04-15
Payer: MEDICARE

## 2025-04-21 ENCOUNTER — TELEPHONE (OUTPATIENT)
Dept: NEUROSURGERY | Facility: CLINIC | Age: 75
End: 2025-04-21
Payer: MEDICARE